# Patient Record
Sex: FEMALE | Race: WHITE | Employment: OTHER | URBAN - METROPOLITAN AREA
[De-identification: names, ages, dates, MRNs, and addresses within clinical notes are randomized per-mention and may not be internally consistent; named-entity substitution may affect disease eponyms.]

---

## 2017-01-03 ENCOUNTER — TELEPHONE (OUTPATIENT)
Dept: INTERNAL MEDICINE CLINIC | Facility: CLINIC | Age: 82
End: 2017-01-03

## 2017-01-05 ENCOUNTER — OFFICE VISIT (OUTPATIENT)
Dept: HEMATOLOGY/ONCOLOGY | Facility: HOSPITAL | Age: 82
End: 2017-01-05
Attending: INTERNAL MEDICINE
Payer: COMMERCIAL

## 2017-01-05 VITALS
DIASTOLIC BLOOD PRESSURE: 64 MMHG | HEIGHT: 63 IN | WEIGHT: 138 LBS | HEART RATE: 67 BPM | RESPIRATION RATE: 16 BRPM | TEMPERATURE: 97 F | SYSTOLIC BLOOD PRESSURE: 157 MMHG | BODY MASS INDEX: 24.45 KG/M2

## 2017-01-05 DIAGNOSIS — Z79.811 ENCOUNTER FOR MONITORING AROMATASE INHIBITOR THERAPY: ICD-10-CM

## 2017-01-05 DIAGNOSIS — Z85.43 PERSONAL HISTORY OF MALIGNANT NEOPLASM OF OVARY: ICD-10-CM

## 2017-01-05 DIAGNOSIS — M85.80 OSTEOPENIA DETERMINED BY X-RAY: ICD-10-CM

## 2017-01-05 DIAGNOSIS — Z51.81 ENCOUNTER FOR MONITORING AROMATASE INHIBITOR THERAPY: ICD-10-CM

## 2017-01-05 DIAGNOSIS — C50.811 CANCER OF OVERLAPPING SITES OF RIGHT FEMALE BREAST (HCC): Primary | ICD-10-CM

## 2017-01-05 PROCEDURE — 99213 OFFICE O/P EST LOW 20 MIN: CPT | Performed by: INTERNAL MEDICINE

## 2017-01-05 PROCEDURE — 99214 OFFICE O/P EST MOD 30 MIN: CPT | Performed by: INTERNAL MEDICINE

## 2017-01-05 RX ORDER — LOVASTATIN 40 MG/1
TABLET ORAL
Qty: 90 TABLET | Refills: 0 | Status: SHIPPED | OUTPATIENT
Start: 2017-01-05 | End: 2017-03-28

## 2017-01-05 NOTE — TELEPHONE ENCOUNTER
Cholesterol Medications: Medication refilled for 90 days per protocol.     Protocol Criteria:  · Appointment scheduled in the past 12 months or in the next 3 months  · ALT & LDL on file in the past 12 months  · ALT result < 80  · LDL result <130   Recent Vi

## 2017-01-15 RX ORDER — ANASTROZOLE 1 MG/1
1 TABLET ORAL DAILY
Qty: 90 TABLET | Refills: 3 | Status: SHIPPED | OUTPATIENT
Start: 2017-01-15 | End: 2018-01-10

## 2017-01-15 NOTE — PROGRESS NOTES
ANA MARIA   Estelita Vazquez is a 80year old female who presents for follow-up of her right breast cancer. She underwent mastectomy 4/27/16. Patient has a history of ovarian carcinoma in 2006.   She did have genetic testing and does not have a clinically signif 4/27/2016 Surgery Mammo screening 9 mm right mid breast lesion, MRI 3 mm suspicious area in the posterior upper outer quadrant R breast Bx + infilftrating ductal G1 with extensive DCIS, + hematoma after the Bx, resolution of the hematoma prior to surgery, betamethasone dipropionate 0.05 % External Cream Apply 1 Application topically 2 (two) times daily. Disp: 45 g Rfl: 0   anastrozole 1 MG Oral Tab tab Take 1 tablet (1 mg total) by mouth daily.  Disp: 90 tablet Rfl: 3   metoprolol Tartrate (LOPRESSOR) 25 MG Smoking status: Never Smoker     Smokeless tobacco: Not on file    Alcohol Use: Yes    Comment: rarely    Drug Use: No    Sexual Activity: Not on file   Not on file  Other Topics Concern    Caffeine Concern Yes    Comment: coffee, 1 cup     Social History Psychiatric: She has a normal mood and affect. Her behavior is normal. Judgment and thought content normal.   Nursing note and vitals reviewed.           ASSESSMENT/PLAN:   Cancer of overlapping sites of right female breast (hcc)  (primary encounter diagnos TECHNIQUE:     Breast MRI was performed using dynamic intravenous gadolinium infusion, thin sections, and a dedicated breast coil.  Contrast enhancement analysis was assisted by computer-aided detection.         ENHANCEMENT PATTERN:  There is scattered fib             GUI, 11/30/2009, 18:33.     INDICATIONS: Postmenopausal and history of breast cancer.     TECHNIQUE:   Measurement of bone mineral density of the lumbar spine and               hip was performed on a Hologic dual energy x-ray               abs Hip Fracture: 4.5 % Dictated by (CST): Marika Quinones MD on 6/13/2016 at  19:21     Approved by (CST): Marika Quinones MD on 6/13/2016 at 19:23         Meds This Visit:  See list above

## 2017-01-31 RX ORDER — LEVOTHYROXINE SODIUM 112 UG/1
TABLET ORAL
Qty: 90 TABLET | Refills: 0 | Status: SHIPPED | OUTPATIENT
Start: 2017-01-31 | End: 2017-04-23

## 2017-01-31 RX ORDER — METOPROLOL TARTRATE 50 MG/1
TABLET, FILM COATED ORAL
Qty: 90 TABLET | Refills: 0 | Status: SHIPPED | OUTPATIENT
Start: 2017-01-31 | End: 2017-04-24

## 2017-02-24 RX ORDER — AMLODIPINE BESYLATE 10 MG/1
TABLET ORAL
Qty: 90 TABLET | Refills: 0 | Status: SHIPPED | OUTPATIENT
Start: 2017-02-24 | End: 2017-05-19

## 2017-03-28 RX ORDER — LOVASTATIN 40 MG/1
TABLET ORAL
Qty: 90 TABLET | Refills: 0 | Status: SHIPPED | OUTPATIENT
Start: 2017-03-28 | End: 2017-06-17

## 2017-04-03 ENCOUNTER — TELEPHONE (OUTPATIENT)
Dept: INTERNAL MEDICINE CLINIC | Facility: CLINIC | Age: 82
End: 2017-04-03

## 2017-04-03 NOTE — TELEPHONE ENCOUNTER
See 1/5 mammogram order from Dr Emani Pham- pt requesting PA/ referral   Scheduled for 4/5 at [facility]

## 2017-04-05 ENCOUNTER — HOSPITAL ENCOUNTER (OUTPATIENT)
Dept: MAMMOGRAPHY | Facility: HOSPITAL | Age: 82
Discharge: HOME | End: 2017-04-05
Attending: SURGERY
Payer: COMMERCIAL

## 2017-04-05 DIAGNOSIS — Z85.3 HISTORY OF BREAST CANCER: ICD-10-CM

## 2017-04-05 PROCEDURE — 77065 DX MAMMO INCL CAD UNI: CPT | Performed by: RADIOLOGY

## 2017-04-23 RX ORDER — METOPROLOL TARTRATE 50 MG/1
TABLET, FILM COATED ORAL
Qty: 90 TABLET | Refills: 0 | Status: CANCELLED | OUTPATIENT
Start: 2017-04-23

## 2017-04-24 ENCOUNTER — OFFICE VISIT (OUTPATIENT)
Dept: INTERNAL MEDICINE CLINIC | Facility: CLINIC | Age: 82
End: 2017-04-24

## 2017-04-24 VITALS
RESPIRATION RATE: 16 BRPM | WEIGHT: 134 LBS | BODY MASS INDEX: 23.74 KG/M2 | HEIGHT: 63 IN | HEART RATE: 85 BPM | SYSTOLIC BLOOD PRESSURE: 148 MMHG | DIASTOLIC BLOOD PRESSURE: 76 MMHG

## 2017-04-24 DIAGNOSIS — C50.811 MALIGNANT NEOPLASM OF OVERLAPPING SITES OF RIGHT FEMALE BREAST (HCC): ICD-10-CM

## 2017-04-24 DIAGNOSIS — E78.2 MIXED HYPERLIPIDEMIA: ICD-10-CM

## 2017-04-24 DIAGNOSIS — I10 ESSENTIAL HYPERTENSION WITH GOAL BLOOD PRESSURE LESS THAN 140/90: Primary | ICD-10-CM

## 2017-04-24 PROCEDURE — 99212 OFFICE O/P EST SF 10 MIN: CPT | Performed by: INTERNAL MEDICINE

## 2017-04-24 PROCEDURE — 99214 OFFICE O/P EST MOD 30 MIN: CPT | Performed by: INTERNAL MEDICINE

## 2017-04-24 RX ORDER — LEVOTHYROXINE SODIUM 112 UG/1
TABLET ORAL
Qty: 90 TABLET | Refills: 0 | Status: SHIPPED | OUTPATIENT
Start: 2017-04-24 | End: 2017-07-12

## 2017-04-24 RX ORDER — METOPROLOL TARTRATE 50 MG/1
50 TABLET, FILM COATED ORAL 2 TIMES DAILY
Qty: 180 TABLET | Refills: 2 | Status: SHIPPED | OUTPATIENT
Start: 2017-04-24 | End: 2017-12-05

## 2017-04-24 NOTE — PROGRESS NOTES
Brittni Sousa is a 80year old female. HPI:   1. Essential hypertension with goal blood pressure less than 140/90    Patient has been following low salt diet and has been taking anti-hypertensive prescriptions as prescribed.  Blood pressure has been checked mass LRF   • Papilloma of right eyelid 14     Excision of papilloma of the RLL   • Pregnancy       x 3   • Rotator cuff injury      right   • Hypertension    • Dyslipidemia    • Peripheral neuropathy due to chemotherapy [facility])    • Breast cancer ( weight. 3. Malignant neoplasm of overlapping sites of right female breast [facility])    Had mastectiomy last year.  Needs 2 new bras and will write referral to [facility]\"    - DME - External    The patient indicates understanding of these issues and

## 2017-05-19 RX ORDER — AMLODIPINE BESYLATE 10 MG/1
TABLET ORAL
Qty: 90 TABLET | Refills: 0 | Status: SHIPPED | OUTPATIENT
Start: 2017-05-19 | End: 2017-08-08

## 2017-06-19 RX ORDER — LOVASTATIN 40 MG/1
TABLET ORAL
Qty: 90 TABLET | Refills: 0 | Status: SHIPPED | OUTPATIENT
Start: 2017-06-19 | End: 2017-09-05

## 2017-06-20 NOTE — TELEPHONE ENCOUNTER
Cholesterol Medications: Refill protocol failed because the patient did not meet the protocol criteria.  Please advise in regards to refill request     Protocol Criteria:  · Appointment scheduled in the past 12 months or in the next 3 months  · ALT & LDL on

## 2017-07-05 ENCOUNTER — TELEPHONE (OUTPATIENT)
Dept: INTERNAL MEDICINE CLINIC | Facility: CLINIC | Age: 82
End: 2017-07-05

## 2017-07-05 DIAGNOSIS — C50.811 MALIGNANT NEOPLASM OF OVERLAPPING SITES OF RIGHT FEMALE BREAST, UNSPECIFIED ESTROGEN RECEPTOR STATUS (HCC): Primary | ICD-10-CM

## 2017-07-05 NOTE — TELEPHONE ENCOUNTER
Pt requesting referral to see Dr. Trinidad Chavez.  Pt has follow up appt with Dr. Trinidad Chavez on 8/14    C50.811 (ICD-10-CM) - 174.8 (ICD-9-CM) - Cancer of overlapping sites of right [facility] ([facility]  Z51.81, Z79.811 (ICD-10-CM) - V58.83, V07.52 (ICD-9-CM) - Encounte

## 2017-07-12 RX ORDER — LEVOTHYROXINE SODIUM 112 UG/1
TABLET ORAL
Qty: 90 TABLET | Refills: 0 | Status: SHIPPED | OUTPATIENT
Start: 2017-07-12 | End: 2017-07-18

## 2017-07-18 RX ORDER — LEVOTHYROXINE SODIUM 112 UG/1
TABLET ORAL
Qty: 90 TABLET | Refills: 0 | Status: SHIPPED | OUTPATIENT
Start: 2017-07-18 | End: 2017-12-05

## 2017-07-26 ENCOUNTER — OFFICE VISIT (OUTPATIENT)
Dept: INTERNAL MEDICINE CLINIC | Facility: CLINIC | Age: 82
End: 2017-07-26

## 2017-07-26 VITALS
WEIGHT: 133 LBS | BODY MASS INDEX: 24 KG/M2 | HEART RATE: 55 BPM | RESPIRATION RATE: 16 BRPM | SYSTOLIC BLOOD PRESSURE: 136 MMHG | DIASTOLIC BLOOD PRESSURE: 76 MMHG

## 2017-07-26 DIAGNOSIS — C50.811 MALIGNANT NEOPLASM OF OVERLAPPING SITES OF RIGHT FEMALE BREAST, UNSPECIFIED ESTROGEN RECEPTOR STATUS (HCC): ICD-10-CM

## 2017-07-26 DIAGNOSIS — E78.2 MIXED HYPERLIPIDEMIA: ICD-10-CM

## 2017-07-26 DIAGNOSIS — I10 ESSENTIAL HYPERTENSION WITH GOAL BLOOD PRESSURE LESS THAN 140/90: Primary | ICD-10-CM

## 2017-07-26 PROCEDURE — 99214 OFFICE O/P EST MOD 30 MIN: CPT | Performed by: INTERNAL MEDICINE

## 2017-07-26 PROCEDURE — 99212 OFFICE O/P EST SF 10 MIN: CPT | Performed by: INTERNAL MEDICINE

## 2017-07-26 NOTE — PROGRESS NOTES
Kodak Dye is a 80year old female. HPI:   1. Essential hypertension with goal blood pressure less than 140/90    Patient has been following low salt diet and has been taking anti-hypertensive prescriptions as prescribed.  Blood pressure has been checked OU   • Osteopenia determined by x-ray 10/18/2007   • Osteoporosis    • Ovarian cancer ([facility] [address] 2006    stage 3C   • Papilloma of right eyelid 12/19/14    Excision of papilloma of the RLL   • Peripheral neuropathy due to chemotherapy [facility])    • Pregnancy     N female breast, unspecified estrogen receptor status    To see dr Freddy Paulson in follow up in next month. Has been doing quite well. Has new bra from [facility]". The patient indicates understanding of these issues and agrees to the plan.     The soy

## 2017-08-08 RX ORDER — AMLODIPINE BESYLATE 10 MG/1
TABLET ORAL
Qty: 90 TABLET | Refills: 0 | Status: SHIPPED | OUTPATIENT
Start: 2017-08-08 | End: 2017-11-22

## 2017-08-08 RX ORDER — AMLODIPINE BESYLATE 10 MG/1
TABLET ORAL
Qty: 90 TABLET | Refills: 0 | Status: SHIPPED | OUTPATIENT
Start: 2017-08-08 | End: 2019-01-22

## 2017-08-14 ENCOUNTER — OFFICE VISIT (OUTPATIENT)
Dept: HEMATOLOGY/ONCOLOGY | Facility: HOSPITAL | Age: 82
End: 2017-08-14
Attending: INTERNAL MEDICINE
Payer: COMMERCIAL

## 2017-08-14 VITALS
DIASTOLIC BLOOD PRESSURE: 64 MMHG | RESPIRATION RATE: 16 BRPM | SYSTOLIC BLOOD PRESSURE: 142 MMHG | HEIGHT: 63 IN | WEIGHT: 134 LBS | HEART RATE: 60 BPM | BODY MASS INDEX: 23.74 KG/M2 | TEMPERATURE: 98 F

## 2017-08-14 DIAGNOSIS — Z17.0 MALIGNANT NEOPLASM OF OVERLAPPING SITES OF RIGHT BREAST IN FEMALE, ESTROGEN RECEPTOR POSITIVE (HCC): Primary | ICD-10-CM

## 2017-08-14 DIAGNOSIS — G62.0 PERIPHERAL NEUROPATHY DUE TO CHEMOTHERAPY (HCC): ICD-10-CM

## 2017-08-14 DIAGNOSIS — C50.811 MALIGNANT NEOPLASM OF OVERLAPPING SITES OF RIGHT BREAST IN FEMALE, ESTROGEN RECEPTOR POSITIVE (HCC): Primary | ICD-10-CM

## 2017-08-14 DIAGNOSIS — T45.1X5A PERIPHERAL NEUROPATHY DUE TO CHEMOTHERAPY (HCC): ICD-10-CM

## 2017-08-14 DIAGNOSIS — C56.9 OVARIAN CANCER, UNSPECIFIED LATERALITY (HCC): ICD-10-CM

## 2017-08-14 DIAGNOSIS — Z79.811 ENCOUNTER FOR MONITORING AROMATASE INHIBITOR THERAPY: ICD-10-CM

## 2017-08-14 DIAGNOSIS — Z51.81 ENCOUNTER FOR MONITORING AROMATASE INHIBITOR THERAPY: ICD-10-CM

## 2017-08-14 DIAGNOSIS — M85.80 OSTEOPENIA DETERMINED BY X-RAY: ICD-10-CM

## 2017-08-14 PROCEDURE — 99212 OFFICE O/P EST SF 10 MIN: CPT | Performed by: INTERNAL MEDICINE

## 2017-08-14 PROCEDURE — 99214 OFFICE O/P EST MOD 30 MIN: CPT | Performed by: INTERNAL MEDICINE

## 2017-08-14 NOTE — PROGRESS NOTES
ANA MARIA Singh is a 80year old female who presents for follow-up of her right breast cancer. She underwent mastectomy 4/27/16. Patient has a history of ovarian carcinoma in 2006.   She did have genetic testing and does not have a clinically signific Mammo screening 9 mm right mid breast lesion, MRI 3 mm suspicious area in the posterior upper outer quadrant R breast Bx + infilftrating ductal G1 with extensive DCIS, + hematoma after the Bx, resolution of the hematoma prior to surgery, genetic testing betamethasone dipropionate 0.05 % External Cream Apply 1 Application topically 2 (two) times daily. Disp: 45 g Rfl: 0   anastrozole 1 MG Oral Tab tab Take 1 tablet (1 mg total) by mouth daily.  Disp: 90 tablet Rfl: 3   Multiple Vitamin (ONE-DAILY MULTI CARLTON Smokeless tobacco: Never Used    Alcohol use Yes    Comment: rarely    Drug use: No    Sexual activity: Not on file     Other Topics Concern    Caffeine Concern Yes    Comment: coffee, 1 cup     Social History Narrative   None on file     Family History Neurological: She is alert and oriented to person, place, and time. No cranial nerve deficit. Skin: Skin is warm and dry. Psychiatric: She has a normal mood and affect.  Her behavior is normal. Judgment and thought content normal.   Nursing note and vit 9/04/2013, 11:28. [facility], [facility]. [facility], [facility] DIGITAL SCREEN W CAD PF, 2/12/2016, 13:45. [facility], [facility]. [facility], [facility] DIGITAL ADDITIONAL VIEW RT, 2/24/2016, 14:08.   [facility], [facility], [facility] DIGITAL UNI RT,   3/09/ INDICATIONS:            Ms. Milderd Wylie, who is 80 years with history of right breast malignancy status post mastectomy 4/27/16, presents for followup breast MRI to reassess a probably benign area of 2 foci in the left breast 5:00 position seen on the patient's M PLEASE NOTE:  A NORMAL MRI DOES NOT EXCLUDE THE POSSIBILITY OF BREAST CANCER.   A CLINICALLY SUSPICIOUS PALPABLE LUMP SHOULD BE BIOPSIED.              PROCEDURE:   MRI BREAST (W+WO) W/CAD BILAT (CPT=77059/0159T)      [facility] 1. The previously seen probably benign foci in the left breast 5:00 position are much less conspicuous than that seen in the prior MRI. There are no suspicious areas of enhancement in the left breast.      2. Status post right breast mastectomy.  No suspici following should be considered for treatment:       A hip or vertebral (clinical or morphometric) fracture       T score < -2.5 at the femoral neck or spine after appropriate  evaluation to exclude secondary causes.       Low bone mass (T score between -1.

## 2017-09-05 RX ORDER — LOVASTATIN 40 MG/1
TABLET ORAL
Qty: 90 TABLET | Refills: 0 | Status: SHIPPED | OUTPATIENT
Start: 2017-09-05 | End: 2017-12-05

## 2017-09-08 RX ORDER — LOVASTATIN 40 MG/1
TABLET ORAL
Qty: 90 TABLET | Refills: 0 | OUTPATIENT
Start: 2017-09-08

## 2017-09-22 PROBLEM — G62.0 PERIPHERAL NEUROPATHY DUE TO CHEMOTHERAPY: Status: ACTIVE | Noted: 2017-09-22

## 2017-09-22 PROBLEM — G62.0 PERIPHERAL NEUROPATHY DUE TO CHEMOTHERAPY (HCC): Status: ACTIVE | Noted: 2017-09-22

## 2017-09-22 PROBLEM — T45.1X5A PERIPHERAL NEUROPATHY DUE TO CHEMOTHERAPY: Status: ACTIVE | Noted: 2017-09-22

## 2017-09-22 PROBLEM — T45.1X5A PERIPHERAL NEUROPATHY DUE TO CHEMOTHERAPY (HCC): Status: ACTIVE | Noted: 2017-09-22

## 2017-10-13 ENCOUNTER — TELEPHONE (OUTPATIENT)
Dept: INTERNAL MEDICINE CLINIC | Facility: CLINIC | Age: 82
End: 2017-10-13

## 2017-10-13 ENCOUNTER — TELEPHONE (OUTPATIENT)
Dept: OPHTHALMOLOGY | Facility: CLINIC | Age: 82
End: 2017-10-13

## 2017-10-13 DIAGNOSIS — H53.9 VISION CHANGES: Primary | ICD-10-CM

## 2017-10-13 NOTE — TELEPHONE ENCOUNTER
Patient has an appointment scheduled with Dr. Chano Valenzuela on 10/17/17  and they need a referral for the appointment. Please do not hesitate to call me if you have any questions about this. Thank you, Mark Jama at Dr. Ronan Harper office [phone].

## 2017-10-17 ENCOUNTER — OFFICE VISIT (OUTPATIENT)
Dept: OPHTHALMOLOGY | Facility: CLINIC | Age: 82
End: 2017-10-17

## 2017-10-17 DIAGNOSIS — Z96.1 PSEUDOPHAKIA OF BOTH EYES: ICD-10-CM

## 2017-10-17 DIAGNOSIS — H02.9 EYELID LESION: ICD-10-CM

## 2017-10-17 DIAGNOSIS — H35.371 EPIRETINAL MEMBRANE, RIGHT EYE: Primary | ICD-10-CM

## 2017-10-17 DIAGNOSIS — H43.393 VITREOUS FLOATERS OF BOTH EYES: ICD-10-CM

## 2017-10-17 PROCEDURE — 99212 OFFICE O/P EST SF 10 MIN: CPT | Performed by: OPHTHALMOLOGY

## 2017-10-17 PROCEDURE — 99243 OFF/OP CNSLTJ NEW/EST LOW 30: CPT | Performed by: OPHTHALMOLOGY

## 2017-10-17 NOTE — PROGRESS NOTES
Shankar Valverde is a 80year old female. HPI:     HPI     Consult    Additional comments: Referral from Dr. Fortunato Tabares denies any blurred vision at distance or near and is happy with her glasses. Pt has no ocular complaints. • Breast Cancer Self 85   • Ovarian Cancer Self 75   • Lipids Neg      no family h/o hyperlipidemia and vascular disease   • Glaucoma Neg    • Macular degeneration Neg        Social History: Smoking status: Never Smoker Right PERRL    Left PERRL          Visual Fields       Left Right     Full Full          Extraocular Movement       Right Left     Full, Ortho Full, Ortho          Dilation     Both eyes:  1.0% Mydriacyl and 2.5% Danie Synephrine @ 3:48 PM            Slit Complete eye exam.    10/17/2017  Scribed by: Viral Houser MD

## 2017-10-17 NOTE — PATIENT INSTRUCTIONS
Epiretinal membrane, right eye  Stable; no treatment. Pseudophakia of both eyes  No treatment. Vitreous floaters of both eyes  No treatment.      Eyelid lesion  Suspicious, umbilicated lid lesion on the right upper lid nasally discussed with patient

## 2017-11-13 ENCOUNTER — TELEPHONE (OUTPATIENT)
Dept: INTERNAL MEDICINE CLINIC | Facility: CLINIC | Age: 82
End: 2017-11-13

## 2017-11-13 DIAGNOSIS — D48.5 NEOPLASM OF UNCERTAIN BEHAVIOR OF SKIN OF EYELID: ICD-10-CM

## 2017-11-13 DIAGNOSIS — E78.2 MIXED HYPERLIPIDEMIA: Primary | ICD-10-CM

## 2017-11-13 NOTE — TELEPHONE ENCOUNTER
Pt is scheduled for an px on 11/22. Pt is requesting to complete labs prior. Please advise on orders.

## 2017-11-13 NOTE — TELEPHONE ENCOUNTER
Pt contacted and informed pt lab order has been generated. Informed pt to fast for 12 hours before getting lab work done.   Pt verbalized understanding

## 2017-11-14 ENCOUNTER — APPOINTMENT (OUTPATIENT)
Dept: LAB | Facility: HOSPITAL | Age: 82
End: 2017-11-14
Attending: OPHTHALMOLOGY
Payer: COMMERCIAL

## 2017-11-14 PROCEDURE — 88305 TISSUE EXAM BY PATHOLOGIST: CPT | Performed by: OPHTHALMOLOGY

## 2017-11-17 ENCOUNTER — TELEPHONE (OUTPATIENT)
Dept: OTHER | Age: 82
End: 2017-11-17

## 2017-11-17 DIAGNOSIS — L98.9 SKIN ABNORMALITIES: Primary | ICD-10-CM

## 2017-11-17 NOTE — TELEPHONE ENCOUNTER
Pt called [facility] dept requesting a referral for Dermatologist. Per pt being referred by Dr. Steiner Her unsure of derm doctors name.  Spoke with Dr. Espinoza Perez office unable to provided name of Dermatologist,per office  Dr. Steiner Her has not documented in pt's chart as of y

## 2017-11-17 NOTE — TELEPHONE ENCOUNTER
Dr Maggie Cohen from [address] [place] [address] Opthalmology calling Dr Marija Arreguin to report that he removed a lesion from pt eyelid and pathology results indicate Basal Cell Carcinoma.   Pt has already been notified from Dr Maggie Cohen and he is faxing over the pathology report to Dr Wong Florentino

## 2017-11-17 NOTE — TELEPHONE ENCOUNTER
Not sure what is going on here. See opthy referral and no derm   I would have her see Kemi or Marv Butts for skin evaluation.

## 2017-11-21 ENCOUNTER — TELEPHONE (OUTPATIENT)
Dept: OPHTHALMOLOGY | Facility: CLINIC | Age: 82
End: 2017-11-21

## 2017-11-22 ENCOUNTER — MED REC SCAN ONLY (OUTPATIENT)
Dept: INTERNAL MEDICINE CLINIC | Facility: CLINIC | Age: 82
End: 2017-11-22

## 2017-11-22 ENCOUNTER — OFFICE VISIT (OUTPATIENT)
Dept: INTERNAL MEDICINE CLINIC | Facility: CLINIC | Age: 82
End: 2017-11-22

## 2017-11-22 ENCOUNTER — LAB ENCOUNTER (OUTPATIENT)
Dept: LAB | Facility: HOSPITAL | Age: 82
End: 2017-11-22
Attending: INTERNAL MEDICINE
Payer: COMMERCIAL

## 2017-11-22 ENCOUNTER — PATIENT MESSAGE (OUTPATIENT)
Dept: INTERNAL MEDICINE CLINIC | Facility: CLINIC | Age: 82
End: 2017-11-22

## 2017-11-22 VITALS
BODY MASS INDEX: 23.21 KG/M2 | RESPIRATION RATE: 16 BRPM | DIASTOLIC BLOOD PRESSURE: 77 MMHG | SYSTOLIC BLOOD PRESSURE: 192 MMHG | WEIGHT: 131 LBS | HEIGHT: 63 IN | HEART RATE: 88 BPM

## 2017-11-22 DIAGNOSIS — C44.111 BASAL CELL CARCINOMA (BCC) OF RIGHT EYELID: Primary | ICD-10-CM

## 2017-11-22 DIAGNOSIS — C44.111 BASAL CELL CARCINOMA (BCC) OF SKIN OF RIGHT EYELID INCLUDING CANTHUS: ICD-10-CM

## 2017-11-22 DIAGNOSIS — E78.2 MIXED HYPERLIPIDEMIA: ICD-10-CM

## 2017-11-22 DIAGNOSIS — Z00.00 PHYSICAL EXAM, ANNUAL: Primary | ICD-10-CM

## 2017-11-22 PROCEDURE — 84443 ASSAY THYROID STIM HORMONE: CPT

## 2017-11-22 PROCEDURE — 36415 COLL VENOUS BLD VENIPUNCTURE: CPT

## 2017-11-22 PROCEDURE — 80053 COMPREHEN METABOLIC PANEL: CPT

## 2017-11-22 PROCEDURE — 85025 COMPLETE CBC W/AUTO DIFF WBC: CPT

## 2017-11-22 PROCEDURE — 81001 URINALYSIS AUTO W/SCOPE: CPT

## 2017-11-22 PROCEDURE — 99397 PER PM REEVAL EST PAT 65+ YR: CPT | Performed by: INTERNAL MEDICINE

## 2017-11-22 PROCEDURE — 80061 LIPID PANEL: CPT

## 2017-11-22 NOTE — PROGRESS NOTES
HPI:   Rosaline Negron is a 80year old female who presents for a complete physical exam. Symptoms: is menopausal. Patient complains of nothing      Immunization History  Administered            Date(s) Administered    HIGH DOSE FLU 65 YRS AND OLDER PRSV FREE tablet (50 mg total) by mouth 2 (two) times daily. Disp: 180 tablet Rfl: 2   betamethasone dipropionate 0.05 % External Cream Apply 1 Application topically 2 (two) times daily.  Disp: 45 g Rfl: 0   anastrozole 1 MG Oral Tab tab Take 1 tablet (1 mg total) by • Heart Disease Brother      CAD - x3 brothers   • Heart Attack Brother      per NG: 3 brothers with MI's   • AAA [OTHER] Brother      AAA   • Diabetes Brother    • Cancer Brother 79     kidney/bladder ca; smoker   • Breast Cancer Daughter 64     DCIS; B mass  LUNGS: clear to auscultation  CARDIO: RRR without murmur  GI: good BS's,no masses, HSM or tenderness  :deferred  RECTAL:deferred  MUSCULOSKELETAL: back is not tender,FROM of the back  EXTREMITIES: no cyanosis, clubbing or edema  NEURO: Oriented luis carlos

## 2017-11-29 NOTE — TELEPHONE ENCOUNTER
Please see patient's email and advise. Referral created, pended.       ----- Message -----     From: Dominique Worley     Sent: 11/28/2017  4:47 PM CST       To: Martin Gasca MD  Subject: RE: Referral Request    Yes, I need a referral to Dr. Sylvia Lockett,

## 2017-12-01 ENCOUNTER — TELEPHONE (OUTPATIENT)
Dept: OTHER | Age: 82
End: 2017-12-01

## 2017-12-01 DIAGNOSIS — E03.9 HYPOTHYROIDISM, UNSPECIFIED TYPE: Primary | ICD-10-CM

## 2017-12-01 NOTE — TELEPHONE ENCOUNTER
----- Message from Jose Macedo MD sent at 12/1/2017 10:53 AM CST -----  Blood test is good with normal blood counts, sugar, liver,  urine, lipids and kidney tests. Thyroid mildly over active.  Skip taking the synthroid 1 day a week and see me in 3 m

## 2017-12-05 RX ORDER — LEVOTHYROXINE SODIUM 112 UG/1
112 TABLET ORAL
Qty: 90 TABLET | Refills: 0 | Status: SHIPPED | OUTPATIENT
Start: 2017-12-05 | End: 2019-04-23

## 2017-12-05 RX ORDER — METOPROLOL TARTRATE 50 MG/1
TABLET, FILM COATED ORAL
Qty: 180 TABLET | Refills: 0 | Status: SHIPPED | OUTPATIENT
Start: 2017-12-05 | End: 2018-11-01

## 2017-12-05 RX ORDER — LOVASTATIN 40 MG/1
TABLET ORAL
Qty: 90 TABLET | Refills: 0 | Status: SHIPPED | OUTPATIENT
Start: 2017-12-05 | End: 2018-03-29

## 2017-12-05 NOTE — TELEPHONE ENCOUNTER
Patient informed of results, and given MD's recommendations. Patient verbalized understanding with intent to comply. Questions answered. Patient will skip taking her levothyroxine on Sundays. Repeat TSH in 3 months prior to appt. TSH order entered.   Pa

## 2017-12-05 NOTE — TELEPHONE ENCOUNTER
Signed Prescriptions Disp Refills    LOVASTATIN 40 MG Oral Tab 90 tablet 0      Sig: TAKE 1 TABLET BY MOUTH NIGHTLY        Authorizing Provider: KANU Solo        Ordering User: Lisy Magaña      Levothyroxine Sodium 112 MCG Oral Tab 90 tablet for [facility] 1 year (around 10/17/2018) for Complete eye exam.          Lab Results  Component Value Date    (H) 11/22/2017   BUN 16 11/22/2017   CREATSERUM 0.73 11/22/2017   BUNCREA 21.9 (H) 11/22/2017   GFRNAA >60 11/22/2017   GFRAA >6 Kahlil Patel MD    Office Visit        Future Appointments       Provider Department Appt Notes    In 3 months Marija Arreguin, Arron Holland MD [facility], [facility], [facility] F/U    In 4 months Christopher Ahuja MD E LDL 93 11/22/2017       Lab Results  Component Value Date   ALT 19 11/22/2017

## 2018-01-10 DIAGNOSIS — Z51.81 ENCOUNTER FOR MONITORING AROMATASE INHIBITOR THERAPY: ICD-10-CM

## 2018-01-10 DIAGNOSIS — C50.811 CANCER OF OVERLAPPING SITES OF RIGHT FEMALE BREAST (HCC): ICD-10-CM

## 2018-01-10 DIAGNOSIS — Z79.811 ENCOUNTER FOR MONITORING AROMATASE INHIBITOR THERAPY: ICD-10-CM

## 2018-01-10 DIAGNOSIS — Z85.43 PERSONAL HISTORY OF MALIGNANT NEOPLASM OF OVARY: ICD-10-CM

## 2018-01-10 RX ORDER — ANASTROZOLE 1 MG/1
TABLET ORAL
Qty: 90 TABLET | Refills: 3 | Status: SHIPPED | OUTPATIENT
Start: 2018-01-10 | End: 2018-04-12

## 2018-01-15 ENCOUNTER — OFFICE VISIT (OUTPATIENT)
Dept: DERMATOLOGY CLINIC | Facility: CLINIC | Age: 83
End: 2018-01-15

## 2018-01-15 DIAGNOSIS — L71.9 ROSACEA: ICD-10-CM

## 2018-01-15 DIAGNOSIS — Z85.828 PERSONAL HISTORY OF SKIN CANCER: ICD-10-CM

## 2018-01-15 DIAGNOSIS — L81.4 SOLAR LENTIGO: ICD-10-CM

## 2018-01-15 DIAGNOSIS — L57.0 ACTINIC KERATOSIS: Primary | ICD-10-CM

## 2018-01-15 PROCEDURE — 99212 OFFICE O/P EST SF 10 MIN: CPT | Performed by: DERMATOLOGY

## 2018-01-15 PROCEDURE — 99201 OFFICE/OUTPT VISIT,NEW,LEVL I: CPT | Performed by: DERMATOLOGY

## 2018-01-15 NOTE — PROGRESS NOTES
Past Medical History:   Diagnosis Date   • Basal cell carcinoma 11/2017    right eyelid   • Breast cancer ([facility] 3/9/2016   • Carcinoma of right eyelid 11/17/2017    Excised by Dr. Tristen Conway - Basal cell carcinoma- nodular type, carcinoma is present at less joanne Social History Narrative   None on file     Family History   Problem Relation Age of Onset   • AAA [OTHER] Father      AAA   • Hypertension Sister    • Diabetes Sister    • Heart Disease Brother      CAD - x3 brothers   • Heart Attack Brother      per

## 2018-01-15 NOTE — PROGRESS NOTES
HPI:     Chief Complaint     Skin Cancer        HPI     Skin Cancer    Additional comments: Pt presents because diagnosed  with BCC to the right upper eyelid- bx performed 11/2017. Pt concerned with lesions to forehead, midbrow area.         Last edited by Disp:  Rfl:    Cholecalciferol (VITAMIN D) 1000 UNITS Oral Cap Take 1,000 Units by mouth daily. Disp:  Rfl:    betamethasone dipropionate 0.05 % External Cream Apply 1 Application topically 2 (two) times daily.  Disp: 45 g Rfl: 0     Allergies:   No Known A Never Used    Alcohol use Yes    Comment: rarely    Drug use: No    Sexual activity: Not on file     Other Topics Concern    Caffeine Concern Yes    Comment: coffee, 1 cup    Pt has a pacemaker No    Pt has a defibrillator No    Reaction to local anestheti understands to put it on  15-20 minutes before outsides so that  it is absorbed and working and to reapply it every few hours. Rosacea-lesions on nose appear to be secondary to rosacea-no treatment at this time.     No orders of the defined types were plac

## 2018-02-14 ENCOUNTER — OFFICE VISIT (OUTPATIENT)
Dept: INTERNAL MEDICINE CLINIC | Facility: CLINIC | Age: 83
End: 2018-02-14

## 2018-02-14 VITALS
RESPIRATION RATE: 16 BRPM | HEART RATE: 65 BPM | SYSTOLIC BLOOD PRESSURE: 122 MMHG | HEIGHT: 63 IN | WEIGHT: 136 LBS | DIASTOLIC BLOOD PRESSURE: 64 MMHG | BODY MASS INDEX: 24.1 KG/M2

## 2018-02-14 DIAGNOSIS — I10 ESSENTIAL HYPERTENSION WITH GOAL BLOOD PRESSURE LESS THAN 140/90: ICD-10-CM

## 2018-02-14 DIAGNOSIS — E78.2 MIXED HYPERLIPIDEMIA: ICD-10-CM

## 2018-02-14 DIAGNOSIS — M79.605 ACUTE LEG PAIN, LEFT: Primary | ICD-10-CM

## 2018-02-14 PROCEDURE — 99214 OFFICE O/P EST MOD 30 MIN: CPT | Performed by: INTERNAL MEDICINE

## 2018-02-14 PROCEDURE — 99212 OFFICE O/P EST SF 10 MIN: CPT | Performed by: INTERNAL MEDICINE

## 2018-02-14 RX ORDER — TRAMADOL HYDROCHLORIDE 50 MG/1
50 TABLET ORAL EVERY 6 HOURS PRN
Qty: 30 TABLET | Refills: 1 | Status: SHIPPED | OUTPATIENT
Start: 2018-02-14 | End: 2018-03-21

## 2018-02-14 NOTE — PROGRESS NOTES
Tutu Devi is a 80year old female. HPI:   1. Leg injury    Pulling a dresser last week and had a dresser fall on her leg. Had pain in left thigh area. Able to walk but uses a cane to ambulate currently. Pain is currently 6-7/10 with ambulation.  Tiff wh Breast cancer ([facility] 3/9/2016   • Carcinoma of right eyelid 11/17/2017    Excised by Dr. Elida Pelletier - Basal cell carcinoma- nodular type, carcinoma is present at less than 0.5 mm from the deep tissure edge   • Dyslipidemia    • Epiretinal membrane (ERM) of right ultram if pain is severe. Checked the  site and found that patient has NOT been prescribed controlled substances in the past and will fill this time.      2. Essential hypertension  with goal blood pressure less than 140/90    Patient instructed to take

## 2018-02-26 ENCOUNTER — OFFICE VISIT (OUTPATIENT)
Dept: DERMATOLOGY CLINIC | Facility: CLINIC | Age: 83
End: 2018-02-26

## 2018-02-26 DIAGNOSIS — D23.70 BENIGN NEOPLASM OF SKIN OF LOWER EXTREMITY, INCLUDING HIP, UNSPECIFIED LATERALITY: ICD-10-CM

## 2018-02-26 DIAGNOSIS — D23.60 BENIGN NEOPLASM OF SKIN OF UPPER EXTREMITY AND SHOULDER, UNSPECIFIED LATERALITY: ICD-10-CM

## 2018-02-26 DIAGNOSIS — Z87.2 HISTORY OF ACTINIC KERATOSES: ICD-10-CM

## 2018-02-26 DIAGNOSIS — L57.8 SUN-DAMAGED SKIN: ICD-10-CM

## 2018-02-26 DIAGNOSIS — D23.30 BENIGN NEOPLASM OF SKIN OF FACE: ICD-10-CM

## 2018-02-26 DIAGNOSIS — L81.4 SOLAR LENTIGO: ICD-10-CM

## 2018-02-26 DIAGNOSIS — D23.4 BENIGN NEOPLASM OF SCALP AND SKIN OF NECK: ICD-10-CM

## 2018-02-26 DIAGNOSIS — D23.5 BENIGN NEOPLASM OF SKIN OF TRUNK, EXCEPT SCROTUM: ICD-10-CM

## 2018-02-26 DIAGNOSIS — Z85.828 PERSONAL HISTORY OF SKIN CANCER: Primary | ICD-10-CM

## 2018-02-26 DIAGNOSIS — L82.1 SEBORRHEIC KERATOSES: ICD-10-CM

## 2018-02-26 PROCEDURE — 99212 OFFICE O/P EST SF 10 MIN: CPT | Performed by: DERMATOLOGY

## 2018-02-26 PROCEDURE — 99213 OFFICE O/P EST LOW 20 MIN: CPT | Performed by: DERMATOLOGY

## 2018-02-26 NOTE — PROGRESS NOTES
HPI:     Chief Complaint     Full Skin Exam        HPI     Full Skin Exam    Additional comments: LOV 01/15/18 pt was seen for lesions on her face that were treated with cryo, pt here for full body check pt has no new spots of concern at this time, pt has Basal cell carcinoma 11/2017    right eyelid   • Breast cancer ([facility] 3/9/2016   • Carcinoma of right eyelid 11/17/2017    Excised by Dr. Nathaniel Ramires - Basal cell carcinoma- nodular type, carcinoma is present at less than 0.5 mm from the deep tissure edge   • Dys Family History   Problem Relation Age of Onset   • AAA [OTHER] Father      AAA   • Hypertension Sister    • Diabetes Sister    • Heart Disease Brother      CAD - x3 brothers   • Heart Attack Brother      per NG: 3 brothers with MI's   • AAA [OTHER] Bro every few hours. Solar lentigo  History of actinic keratoses  Benign neoplasm of skin of trunk, except scrotum-ABCDE's of melanoma discussed. the patient is to follow up yearly. The patient will come sooner should they note  anything new or changing.

## 2018-03-21 ENCOUNTER — APPOINTMENT (OUTPATIENT)
Dept: LAB | Age: 83
End: 2018-03-21
Attending: INTERNAL MEDICINE
Payer: COMMERCIAL

## 2018-03-21 ENCOUNTER — OFFICE VISIT (OUTPATIENT)
Dept: INTERNAL MEDICINE CLINIC | Facility: CLINIC | Age: 83
End: 2018-03-21

## 2018-03-21 VITALS
HEIGHT: 63 IN | WEIGHT: 128 LBS | SYSTOLIC BLOOD PRESSURE: 130 MMHG | BODY MASS INDEX: 22.68 KG/M2 | RESPIRATION RATE: 16 BRPM | HEART RATE: 64 BPM | DIASTOLIC BLOOD PRESSURE: 77 MMHG

## 2018-03-21 DIAGNOSIS — E78.2 MIXED HYPERLIPIDEMIA: ICD-10-CM

## 2018-03-21 DIAGNOSIS — I10 ESSENTIAL HYPERTENSION WITH GOAL BLOOD PRESSURE LESS THAN 140/90: Primary | ICD-10-CM

## 2018-03-21 DIAGNOSIS — E03.9 HYPOTHYROIDISM, UNSPECIFIED TYPE: ICD-10-CM

## 2018-03-21 LAB — TSH SERPL-ACNC: 0.87 UIU/ML (ref 0.45–5.33)

## 2018-03-21 PROCEDURE — 99214 OFFICE O/P EST MOD 30 MIN: CPT | Performed by: INTERNAL MEDICINE

## 2018-03-21 PROCEDURE — 84443 ASSAY THYROID STIM HORMONE: CPT

## 2018-03-21 PROCEDURE — 36415 COLL VENOUS BLD VENIPUNCTURE: CPT

## 2018-03-21 PROCEDURE — 99212 OFFICE O/P EST SF 10 MIN: CPT | Performed by: INTERNAL MEDICINE

## 2018-03-21 NOTE — PROGRESS NOTES
Jer Garcia is a 80year old female. HPI:     1. Essential hypertension with goal blood pressure less than 140/90    Patient has been following low salt diet and has been taking anti-hypertensive prescriptions as prescribed.  Blood pressure has been check carcinoma is present at less than 0.5 mm from the deep tissure edge   • Dyslipidemia    • Epiretinal membrane (ERM) of right eye    • Hypertension    • Hypothyroidism 1990   • Mass 2013    excision mass LRF   • MGD (meibomian gland dysfunction) 2011    OU Hyperlipidemia     Patient instructed to take cholesterol lowering medications as prescribed and to continue to follow a low fat, low cholesterol diet as discussed.  Try to exercise at least 3 times weekly to build strength, burn calories and help to [employer]

## 2018-04-02 ENCOUNTER — PATIENT MESSAGE (OUTPATIENT)
Dept: INTERNAL MEDICINE CLINIC | Facility: CLINIC | Age: 83
End: 2018-04-02

## 2018-04-02 DIAGNOSIS — C50.811 MALIGNANT NEOPLASM OF OVERLAPPING SITES OF RIGHT FEMALE BREAST, UNSPECIFIED ESTROGEN RECEPTOR STATUS (HCC): Primary | ICD-10-CM

## 2018-04-02 RX ORDER — LOVASTATIN 40 MG/1
TABLET ORAL
Qty: 90 TABLET | Refills: 0 | Status: SHIPPED | OUTPATIENT
Start: 2018-04-02 | End: 2018-06-27

## 2018-04-11 ENCOUNTER — TELEPHONE (OUTPATIENT)
Dept: HEMATOLOGY/ONCOLOGY | Facility: HOSPITAL | Age: 83
End: 2018-04-11

## 2018-04-12 ENCOUNTER — OFFICE VISIT (OUTPATIENT)
Dept: HEMATOLOGY/ONCOLOGY | Facility: HOSPITAL | Age: 83
End: 2018-04-12
Attending: INTERNAL MEDICINE
Payer: COMMERCIAL

## 2018-04-12 VITALS
TEMPERATURE: 99 F | WEIGHT: 133 LBS | SYSTOLIC BLOOD PRESSURE: 151 MMHG | DIASTOLIC BLOOD PRESSURE: 62 MMHG | HEIGHT: 63 IN | BODY MASS INDEX: 23.57 KG/M2 | RESPIRATION RATE: 16 BRPM | HEART RATE: 61 BPM

## 2018-04-12 DIAGNOSIS — C44.111 BASAL CELL CARCINOMA (BCC) OF SKIN OF RIGHT EYELID INCLUDING CANTHUS: ICD-10-CM

## 2018-04-12 DIAGNOSIS — T45.1X5A PERIPHERAL NEUROPATHY DUE TO CHEMOTHERAPY (HCC): ICD-10-CM

## 2018-04-12 DIAGNOSIS — Z51.81 ENCOUNTER FOR MONITORING AROMATASE INHIBITOR THERAPY: ICD-10-CM

## 2018-04-12 DIAGNOSIS — M85.80 OSTEOPENIA DETERMINED BY X-RAY: ICD-10-CM

## 2018-04-12 DIAGNOSIS — C50.811 MALIGNANT NEOPLASM OF OVERLAPPING SITES OF RIGHT BREAST IN FEMALE, ESTROGEN RECEPTOR POSITIVE (HCC): Primary | ICD-10-CM

## 2018-04-12 DIAGNOSIS — Z17.0 MALIGNANT NEOPLASM OF OVERLAPPING SITES OF RIGHT BREAST IN FEMALE, ESTROGEN RECEPTOR POSITIVE (HCC): Primary | ICD-10-CM

## 2018-04-12 DIAGNOSIS — G62.0 PERIPHERAL NEUROPATHY DUE TO CHEMOTHERAPY (HCC): ICD-10-CM

## 2018-04-12 DIAGNOSIS — Z79.811 ENCOUNTER FOR MONITORING AROMATASE INHIBITOR THERAPY: ICD-10-CM

## 2018-04-12 DIAGNOSIS — C56.3 MALIGNANT NEOPLASM OF BOTH OVARIES (HCC): ICD-10-CM

## 2018-04-12 PROCEDURE — 99214 OFFICE O/P EST MOD 30 MIN: CPT | Performed by: INTERNAL MEDICINE

## 2018-04-12 RX ORDER — ANASTROZOLE 1 MG/1
1 TABLET ORAL
Qty: 90 TABLET | Refills: 3 | Status: SHIPPED | OUTPATIENT
Start: 2018-04-12 | End: 2019-06-10

## 2018-05-09 RX ORDER — AMLODIPINE BESYLATE 10 MG/1
TABLET ORAL
Qty: 90 TABLET | Refills: 0 | Status: SHIPPED | OUTPATIENT
Start: 2018-05-09 | End: 2018-06-27

## 2018-05-28 NOTE — PROGRESS NOTES
ANA MARIA Ferguson is a 80year old female who presents for follow-up of her right hormone receptor positive, HER-2/yusuf negative, pT1b N1(mi) M0 breast cancer. She underwent mastectomy 4/27/16.    Patient has a history of ovarian carcinoma Stage IIIC in Mammo screening 9 mm right mid breast lesion, MRI 3 mm suspicious area in the posterior upper outer quadrant R breast Bx + infilftrating ductal G1 with extensive DCIS, + hematoma after the Bx, resolution of the hematoma prior to surgery, genetic testing METOPROLOL TARTRATE 50 MG Oral Tab TAKE 1 TABLET BY MOUTH TWICE DAILY Disp: 180 tablet Rfl: 0   AMLODIPINE BESYLATE 10 MG Oral Tab TAKE 1 TABLET BY MOUTH DAILY Disp: 90 tablet Rfl: 0   Multiple Vitamin (ONE-DAILY MULTI VITAMINS) Oral Tab Take 1 tablet by m No date: ORAL SURGERY PROCEDURE  No date: TUBAL LIGATION    Social History  Social History   Marital status:   Spouse name: N/A    Years of education: N/A  Number of children: 3     Occupational History  [employer] executive  retired   AAA Pulmonary/Chest: Effort normal and breath sounds normal. No respiratory distress. She has no wheezes. Abdominal: Soft. Bowel sounds are normal. She exhibits no distension and no mass. There is no tenderness. Musculoskeletal: Normal range of motion. Patient has some symptoms suggestive of peripheral neuropathy which may be related to her chemotherapy for her ovarian carcinoma. 30 total minutes spent with patient >50% of visit was spent in counseling and coordination of care.     Results:  Rayray 5.0 - 8.0 6.0   PROTEIN (URINE DIPSTICK)      Negative mg/dL Negative   GLUCOSE (URINE DIPSTICK)      Negative mg/dL Negative   KETONES (URINE DIPSTICK)      Negative mg/dL Negative   BILIRUBIN      Negative Negative   OCCULT BLOOD      Negative Negati 9/04/2013, 11:28. [facility], [facility]. [facility], [facility] DIGITAL SCREEN W CAD PF, 2/12/2016, 13:45. [facility], [facility]. [facility], [facility] DIGITAL ADDITIONAL VIEW RT, 2/24/2016, 14:08.   [facility], [facility], [facility] DIGITAL UNI RT,   3/09/ INDICATIONS:            Ms. Mildred Wylie, who is 80 years with history of right breast malignancy status post mastectomy 4/27/16, presents for followup breast MRI to reassess a probably benign area of 2 foci in the left breast 5:00 position seen on the patient's M PLEASE NOTE:  A NORMAL MRI DOES NOT EXCLUDE THE POSSIBILITY OF BREAST CANCER.   A CLINICALLY SUSPICIOUS PALPABLE LUMP SHOULD BE BIOPSIED.              PROCEDURE:   MRI BREAST (W+WO) W/CAD BILAT (CPT=77059/0159T)      [facility] [place] 1. The previously seen probably benign foci in the left breast 5:00 position are much less conspicuous than that seen in the prior MRI. There are no suspicious areas of enhancement in the left breast.      2. Status post right breast mastectomy.  No suspici following should be considered for treatment:       A hip or vertebral (clinical or morphometric) fracture       T score < -2.5 at the femoral neck or spine after appropriate  evaluation to exclude secondary causes.       Low bone mass (T score between -1.

## 2018-06-27 ENCOUNTER — OFFICE VISIT (OUTPATIENT)
Dept: INTERNAL MEDICINE CLINIC | Facility: CLINIC | Age: 83
End: 2018-06-27

## 2018-06-27 VITALS
DIASTOLIC BLOOD PRESSURE: 84 MMHG | WEIGHT: 132 LBS | HEIGHT: 63 IN | BODY MASS INDEX: 23.39 KG/M2 | SYSTOLIC BLOOD PRESSURE: 138 MMHG | RESPIRATION RATE: 16 BRPM | HEART RATE: 64 BPM

## 2018-06-27 DIAGNOSIS — I10 ESSENTIAL HYPERTENSION WITH GOAL BLOOD PRESSURE LESS THAN 140/90: Primary | ICD-10-CM

## 2018-06-27 DIAGNOSIS — E78.2 MIXED HYPERLIPIDEMIA: ICD-10-CM

## 2018-06-27 DIAGNOSIS — E03.9 HYPOTHYROIDISM, UNSPECIFIED TYPE: ICD-10-CM

## 2018-06-27 DIAGNOSIS — C50.011 MALIGNANT NEOPLASM OF AREOLA OF RIGHT BREAST IN FEMALE, UNSPECIFIED ESTROGEN RECEPTOR STATUS (HCC): ICD-10-CM

## 2018-06-27 PROCEDURE — 99212 OFFICE O/P EST SF 10 MIN: CPT | Performed by: INTERNAL MEDICINE

## 2018-06-27 PROCEDURE — 99214 OFFICE O/P EST MOD 30 MIN: CPT | Performed by: INTERNAL MEDICINE

## 2018-06-27 RX ORDER — LOVASTATIN 40 MG/1
TABLET ORAL
Qty: 90 TABLET | Refills: 0 | Status: SHIPPED | OUTPATIENT
Start: 2018-06-27 | End: 2018-11-01

## 2018-06-27 NOTE — PROGRESS NOTES
Raisa Herrera is a 80year old female. HPI:     1. Essential hypertension with goal blood pressure less than 140/90    Patient has been following low salt diet and has been taking anti-hypertensive prescriptions as prescribed.  Blood pressure has been check type, carcinoma is present at less than 0.5 mm from the deep tissure edge   • Dyslipidemia    • Epiretinal membrane (ERM) of right eye    • Hypertension    • Hypothyroidism 1990   • Malignant neoplasm of overlapping sites of right breast in female, estroge weekly will help to curb one's appetite, control weight and lead to better blood pressure control.     2. Mixed Hyperlipidemia     Patient instructed to take cholesterol lowering medications as prescribed and to continue to follow a low fat, low cholesterol

## 2018-07-16 ENCOUNTER — TELEPHONE (OUTPATIENT)
Dept: INTERNAL MEDICINE CLINIC | Facility: CLINIC | Age: 83
End: 2018-07-16

## 2018-07-16 DIAGNOSIS — Z01.00 EYE EXAM, ROUTINE: Primary | ICD-10-CM

## 2018-07-16 NOTE — TELEPHONE ENCOUNTER
[facility] dr[facility] Nathaniel Ramires.  Patient wants a call once the referral is in

## 2018-07-20 ENCOUNTER — MED REC SCAN ONLY (OUTPATIENT)
Dept: INTERNAL MEDICINE CLINIC | Facility: CLINIC | Age: 83
End: 2018-07-20

## 2018-07-24 ENCOUNTER — HOSPITAL ENCOUNTER (OUTPATIENT)
Dept: MAMMOGRAPHY | Facility: HOSPITAL | Age: 83
Discharge: HOME | End: 2018-07-24
Attending: INTERNAL MEDICINE
Payer: COMMERCIAL

## 2018-07-24 DIAGNOSIS — C50.811 MALIGNANT NEOPLASM OF OVERLAPPING SITES OF RIGHT BREAST IN FEMALE, ESTROGEN RECEPTOR POSITIVE (HCC): ICD-10-CM

## 2018-07-24 DIAGNOSIS — Z51.81 ENCOUNTER FOR MONITORING AROMATASE INHIBITOR THERAPY: ICD-10-CM

## 2018-07-24 DIAGNOSIS — Z17.0 MALIGNANT NEOPLASM OF OVERLAPPING SITES OF RIGHT BREAST IN FEMALE, ESTROGEN RECEPTOR POSITIVE (HCC): ICD-10-CM

## 2018-07-24 DIAGNOSIS — Z79.811 ENCOUNTER FOR MONITORING AROMATASE INHIBITOR THERAPY: ICD-10-CM

## 2018-07-24 PROCEDURE — 77063 BREAST TOMOSYNTHESIS BI: CPT | Performed by: INTERNAL MEDICINE

## 2018-07-24 PROCEDURE — 77067 SCR MAMMO BI INCL CAD: CPT | Performed by: INTERNAL MEDICINE

## 2018-08-07 ENCOUNTER — NURSE TRIAGE (OUTPATIENT)
Dept: INTERNAL MEDICINE CLINIC | Facility: CLINIC | Age: 83
End: 2018-08-07

## 2018-08-07 NOTE — TELEPHONE ENCOUNTER
Action Requested: Summary for Provider     []  Critical Lab, Recommendations Needed  [x] Need Additional Advice  []   FYI    []   Need Orders  [] Need Medications Sent to Pharmacy  []  Other     SUMMARY: PT REFUSING ER, leg edema, pain    Pt states she was

## 2018-08-08 ENCOUNTER — APPOINTMENT (OUTPATIENT)
Dept: ULTRASOUND IMAGING | Facility: HOSPITAL | Age: 83
End: 2018-08-08
Attending: EMERGENCY MEDICINE
Payer: COMMERCIAL

## 2018-08-08 ENCOUNTER — APPOINTMENT (OUTPATIENT)
Dept: GENERAL RADIOLOGY | Facility: HOSPITAL | Age: 83
End: 2018-08-08
Attending: EMERGENCY MEDICINE
Payer: COMMERCIAL

## 2018-08-08 ENCOUNTER — HOSPITAL ENCOUNTER (EMERGENCY)
Facility: HOSPITAL | Age: 83
Discharge: HOME | End: 2018-08-08
Attending: EMERGENCY MEDICINE
Payer: COMMERCIAL

## 2018-08-08 VITALS
BODY MASS INDEX: 23.04 KG/M2 | RESPIRATION RATE: 18 BRPM | WEIGHT: 130 LBS | HEART RATE: 60 BPM | SYSTOLIC BLOOD PRESSURE: 144 MMHG | HEIGHT: 63 IN | OXYGEN SATURATION: 99 % | TEMPERATURE: 99 F | DIASTOLIC BLOOD PRESSURE: 64 MMHG

## 2018-08-08 DIAGNOSIS — M79.604 PAIN OF RIGHT LOWER EXTREMITY: Primary | ICD-10-CM

## 2018-08-08 PROCEDURE — 93971 EXTREMITY STUDY: CPT | Performed by: EMERGENCY MEDICINE

## 2018-08-08 PROCEDURE — 73590 X-RAY EXAM OF LOWER LEG: CPT | Performed by: EMERGENCY MEDICINE

## 2018-08-08 PROCEDURE — 99284 EMERGENCY DEPT VISIT MOD MDM: CPT

## 2018-08-08 NOTE — TELEPHONE ENCOUNTER
I am leaving town this AM for 10 days. If swelling /pain remains I believe patient should be seen and evaluated in ER with a history of cancer to make sure no clot exists after such long car ride following trauma.

## 2018-08-08 NOTE — ED NOTES
Pt alert and interactive. Complains of RLE discomfort and swelling after a fall in the woods 10 days. Skin intact. Neuro intact. Full ROM noted. Non-pitting edema noted. Pt does report she hit her RLE on a log during the fall. Pt is able to bear weight.  Sp

## 2018-08-08 NOTE — ED INITIAL ASSESSMENT (HPI)
Right leg tenderness and swelling luna the past 10days. Was picking blueberries in [place] ago and fell injuring right leg. Had 11+ hr car ride home and pain in is persistent.

## 2018-08-08 NOTE — ED PROVIDER NOTES
Patient Seen in: [facility] Emergency Department    History   Patient presents with:  Lower Extremity Injury (musculoskeletal)    Stated Complaint: right leg swelling     HPI    78-year-old female with history of hypertension, hypothyroidism, and OD       Past Surgical History:  2006: BILAT SALPINGO-OOPHORECT W/ OMENTECT, TOTAL AB*  3/9/16: BIOPSY OF BREAST, NEEDLE CORE Right  4/7/14: CATARACT EXTRACTION W/  INTRAOCULAR LENS IMPLA* Right      Comment: ABDON  1/20/14: CATARACT EXTRACTION W/  Don[place]on Door leg.  No knee, ankle, or foot tenderness noted. Bilateral dorsalis pedis pulses intact and symmetric.   Psychiatric: patient is oriented X 3, there is no agitation        ED Course   Labs Reviewed - No data to display    ED Course as of Aug 08 1417  ------

## 2018-08-14 RX ORDER — AMLODIPINE BESYLATE 10 MG/1
TABLET ORAL
Qty: 90 TABLET | Refills: 0 | Status: SHIPPED | OUTPATIENT
Start: 2018-08-14 | End: 2018-10-02

## 2018-08-14 RX ORDER — LEVOTHYROXINE SODIUM 112 UG/1
TABLET ORAL
Qty: 90 TABLET | Refills: 0 | Status: SHIPPED | OUTPATIENT
Start: 2018-08-14 | End: 2018-10-02

## 2018-08-15 NOTE — TELEPHONE ENCOUNTER
Hypertensive Medications  Protocol Criteria:  · Appointment scheduled in the past 6 months or in the next 3 months  · BMP or CMP in the past 12 months  · Creatinine result < 2  Recent Outpatient Visits            1 month ago Essential hypertension with Becky Medrano scheduled in the past 12 months or the next 3 months  TSH resulted in the past 12 months that is normal  Recent Outpatient Visits            1 month ago Essential hypertension with goal blood pressure less than 140/90    [facility], [facility], [address]

## 2018-08-20 ENCOUNTER — OFFICE VISIT (OUTPATIENT)
Dept: DERMATOLOGY CLINIC | Facility: CLINIC | Age: 83
End: 2018-08-20

## 2018-08-20 DIAGNOSIS — L82.1 SEBORRHEIC KERATOSES: ICD-10-CM

## 2018-08-20 DIAGNOSIS — D23.4 BENIGN NEOPLASM OF SCALP AND SKIN OF NECK: ICD-10-CM

## 2018-08-20 DIAGNOSIS — Z85.828 PERSONAL HISTORY OF SKIN CANCER: Primary | ICD-10-CM

## 2018-08-20 DIAGNOSIS — D23.5 BENIGN NEOPLASM OF SKIN OF TRUNK, EXCEPT SCROTUM: ICD-10-CM

## 2018-08-20 DIAGNOSIS — D23.70 BENIGN NEOPLASM OF SKIN OF LOWER EXTREMITY, INCLUDING HIP, UNSPECIFIED LATERALITY: ICD-10-CM

## 2018-08-20 DIAGNOSIS — D23.60 BENIGN NEOPLASM OF SKIN OF UPPER EXTREMITY AND SHOULDER, UNSPECIFIED LATERALITY: ICD-10-CM

## 2018-08-20 DIAGNOSIS — D23.30 BENIGN NEOPLASM OF SKIN OF FACE: ICD-10-CM

## 2018-08-20 DIAGNOSIS — L57.8 SUN-DAMAGED SKIN: ICD-10-CM

## 2018-08-20 DIAGNOSIS — L81.4 SOLAR LENTIGO: ICD-10-CM

## 2018-08-20 DIAGNOSIS — Z87.2 HISTORY OF ACTINIC KERATOSES: ICD-10-CM

## 2018-08-20 PROCEDURE — 99212 OFFICE O/P EST SF 10 MIN: CPT | Performed by: DERMATOLOGY

## 2018-08-20 PROCEDURE — 99213 OFFICE O/P EST LOW 20 MIN: CPT | Performed by: DERMATOLOGY

## 2018-08-20 NOTE — PROGRESS NOTES
HPI:     Chief Complaint     Actinic Keratosis        HPI     Actinic Keratosis    Additional comments: LOV: 2-26-18. Pt presents today for 6 month f/u full body skin eval pt denies something new or changing she notcied. Pt with personal hx of BCC, Ak's. LEVOTHYROXINE SODIUM 112 MCG Oral Tab TAKE 1 TABLET BY MOUTH DAILY BEFORE BREAKFAST Disp: 90 tablet Rfl: 0     Allergies:   No Known Allergies    Past Medical History:   Diagnosis Date   • Basal cell carcinoma 11/2017    right eyelid   • Breast cancer (H Used    Alcohol use Yes    Comment: rarely    Drug use: No    Sexual activity: Not on file     Other Topics Concern    Caffeine Concern Yes    Comment: coffee, 1 cup    Pt has a pacemaker No    Pt has a defibrillator No    Reaction to local anesthetic No checks. She will come sooner if notes anything new or changing. Sun-damaged skin-proper use and application of broad-spectrum sunblock SPF 30 or higher discussed.   Patient understands to put it on  15-20 minutes before outsides so that  it is absorbed an

## 2018-08-20 NOTE — PROGRESS NOTES
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 Elida Pelletier - Basal cell carcinoma- nodular type, carcinoma is present at less joanne coffee, 1 cup    Pt has a pacemaker No    Pt has a defibrillator No    Reaction to local anesthetic No     Social History Narrative   None on file     Family History   Problem Relation Age of Onset   • AAA [OTHER] Father      AAA   • Hypertension Sister

## 2018-09-14 ENCOUNTER — TELEPHONE (OUTPATIENT)
Dept: INTERNAL MEDICINE CLINIC | Facility: CLINIC | Age: 83
End: 2018-09-14

## 2018-09-14 NOTE — TELEPHONE ENCOUNTER
Pt called in requesting to have her referral dated 6/27/18 for her swim form to be faxed directly to [employer] at fax: [phone], ph: [phone]. Please advise pt of confirmation.

## 2018-10-02 ENCOUNTER — OFFICE VISIT (OUTPATIENT)
Dept: INTERNAL MEDICINE CLINIC | Facility: CLINIC | Age: 83
End: 2018-10-02

## 2018-10-02 VITALS
SYSTOLIC BLOOD PRESSURE: 140 MMHG | HEART RATE: 64 BPM | BODY MASS INDEX: 22.32 KG/M2 | RESPIRATION RATE: 16 BRPM | WEIGHT: 126 LBS | DIASTOLIC BLOOD PRESSURE: 74 MMHG | HEIGHT: 63 IN

## 2018-10-02 DIAGNOSIS — I10 ESSENTIAL HYPERTENSION WITH GOAL BLOOD PRESSURE LESS THAN 140/90: Primary | ICD-10-CM

## 2018-10-02 DIAGNOSIS — E03.9 ACQUIRED HYPOTHYROIDISM: ICD-10-CM

## 2018-10-02 DIAGNOSIS — Z23 NEED FOR VACCINATION: ICD-10-CM

## 2018-10-02 DIAGNOSIS — E78.2 MIXED HYPERLIPIDEMIA: ICD-10-CM

## 2018-10-02 PROCEDURE — 90471 IMMUNIZATION ADMIN: CPT | Performed by: INTERNAL MEDICINE

## 2018-10-02 PROCEDURE — 99214 OFFICE O/P EST MOD 30 MIN: CPT | Performed by: INTERNAL MEDICINE

## 2018-10-02 PROCEDURE — 99212 OFFICE O/P EST SF 10 MIN: CPT | Performed by: INTERNAL MEDICINE

## 2018-10-02 PROCEDURE — 90653 IIV ADJUVANT VACCINE IM: CPT | Performed by: INTERNAL MEDICINE

## 2018-10-02 NOTE — PROGRESS NOTES
Denise Woo is a 80year old female. HPI:     1. Essential hypertension with goal blood pressure less than 140/90    Patient has been following low salt diet and has been taking anti-hypertensive prescriptions as prescribed.  Blood pressure has been check type, carcinoma is present at less than 0.5 mm from the deep tissure edge   • Dyslipidemia    • Epiretinal membrane (ERM) of right eye    • Hypertension    • Hypothyroidism 1990   • Malignant neoplasm of overlapping sites of right breast in female, estroge one's appetite, control weight and lead to better blood pressure control. BP running a little high. Check BP at home and follow up if >140/90    - CBC WITH DIFFERENTIAL WITH PLATELET; Future  - COMP METABOLIC PANEL (14); Future  - URINALYSIS, ROUTINE;  Futu

## 2018-11-02 RX ORDER — AMLODIPINE BESYLATE 10 MG/1
TABLET ORAL
Qty: 90 TABLET | Refills: 0 | Status: SHIPPED | OUTPATIENT
Start: 2018-11-02 | End: 2019-01-22

## 2018-11-02 RX ORDER — LOVASTATIN 40 MG/1
TABLET ORAL
Qty: 90 TABLET | Refills: 0 | Status: SHIPPED | OUTPATIENT
Start: 2018-11-02 | End: 2019-01-22

## 2018-11-02 RX ORDER — LEVOTHYROXINE SODIUM 112 UG/1
TABLET ORAL
Qty: 90 TABLET | Refills: 0 | Status: SHIPPED | OUTPATIENT
Start: 2018-11-02 | End: 2019-01-22

## 2018-11-02 RX ORDER — METOPROLOL TARTRATE 50 MG/1
TABLET, FILM COATED ORAL
Qty: 180 TABLET | Refills: 0 | Status: SHIPPED | OUTPATIENT
Start: 2018-11-02 | End: 2019-01-22

## 2018-11-21 ENCOUNTER — LAB ENCOUNTER (OUTPATIENT)
Dept: LAB | Age: 83
End: 2018-11-21
Attending: INTERNAL MEDICINE
Payer: COMMERCIAL

## 2018-11-21 DIAGNOSIS — I10 ESSENTIAL HYPERTENSION WITH GOAL BLOOD PRESSURE LESS THAN 140/90: ICD-10-CM

## 2018-11-21 DIAGNOSIS — E78.2 MIXED HYPERLIPIDEMIA: ICD-10-CM

## 2018-11-21 PROCEDURE — 84443 ASSAY THYROID STIM HORMONE: CPT

## 2018-11-21 PROCEDURE — 36415 COLL VENOUS BLD VENIPUNCTURE: CPT

## 2018-11-21 PROCEDURE — 81001 URINALYSIS AUTO W/SCOPE: CPT

## 2018-11-21 PROCEDURE — 80061 LIPID PANEL: CPT

## 2018-11-21 PROCEDURE — 85025 COMPLETE CBC W/AUTO DIFF WBC: CPT

## 2018-11-21 PROCEDURE — 80053 COMPREHEN METABOLIC PANEL: CPT

## 2018-11-28 DIAGNOSIS — E03.9 ACQUIRED HYPOTHYROIDISM: Primary | ICD-10-CM

## 2018-12-26 ENCOUNTER — HOSPITAL ENCOUNTER (EMERGENCY)
Facility: HOSPITAL | Age: 83
Discharge: HOME | End: 2018-12-26
Payer: COMMERCIAL

## 2018-12-26 ENCOUNTER — APPOINTMENT (OUTPATIENT)
Dept: GENERAL RADIOLOGY | Facility: HOSPITAL | Age: 83
End: 2018-12-26
Payer: COMMERCIAL

## 2018-12-26 VITALS
BODY MASS INDEX: 22.15 KG/M2 | TEMPERATURE: 99 F | SYSTOLIC BLOOD PRESSURE: 179 MMHG | OXYGEN SATURATION: 96 % | HEART RATE: 63 BPM | RESPIRATION RATE: 18 BRPM | DIASTOLIC BLOOD PRESSURE: 76 MMHG | HEIGHT: 63 IN | WEIGHT: 125 LBS

## 2018-12-26 DIAGNOSIS — S42.295A OTHER CLOSED NONDISPLACED FRACTURE OF PROXIMAL END OF LEFT HUMERUS, INITIAL ENCOUNTER: Primary | ICD-10-CM

## 2018-12-26 PROCEDURE — 99283 EMERGENCY DEPT VISIT LOW MDM: CPT

## 2018-12-26 PROCEDURE — 73060 X-RAY EXAM OF HUMERUS: CPT

## 2018-12-26 NOTE — ED INITIAL ASSESSMENT (HPI)
Pt presents with left forearm pain s/p tripping and falling inside the house yesterday. Pt denies hitting head.

## 2018-12-27 ENCOUNTER — TELEPHONE (OUTPATIENT)
Dept: CASE MANAGEMENT | Age: 83
End: 2018-12-27

## 2018-12-27 DIAGNOSIS — S42.201A CLOSED FRACTURE OF PROXIMAL END OF RIGHT HUMERUS, UNSPECIFIED FRACTURE MORPHOLOGY, INITIAL ENCOUNTER: Primary | ICD-10-CM

## 2018-12-27 NOTE — TELEPHONE ENCOUNTER
Hi Dr. Tawny Aschoff was see in the ED yesterday and she has a fracture. They would like her to follow up with Dr. Giorgi Wright.     If you agree can you please sign referral.      Thank you  Anabel thompson

## 2018-12-27 NOTE — ED PROVIDER NOTES
Patient Seen in: [facility] AND [facility] Emergency Department    History   No chief complaint on file. Stated Complaint: Upper Extermity Injury (Right shoulder and arm).  \" I had on the wrong shoes and*    HPI    HPI: Virginia Muñoz is a 80year old female wh ORAL SURGERY PROCEDURE     • TUBAL LIGATION         Medications :   LEVOTHYROXINE SODIUM 112 MCG Oral Tab,  TAKE 1 TABLET BY MOUTH DAILY BEFORE BREAKFAST   AMLODIPINE BESYLATE 10 MG Oral Tab,  TAKE 1 TABLET BY MOUTH DAILY   METOPROLOL TARTRATE 50 MG Oral T and agreed except as otherwise stated in HPI.     Physical Exam     ED Triage Vitals [12/26/18 1248]   BP (!) 188/74   Pulse 69   Resp 18   Temp 98.5 °F (36.9 °C)   Temp src Oral   SpO2 96 %   O2 Device None (Room air)       Current:BP (!) 179/76   Pulse 63 Plan     Clinical Impression:  Other closed nondisplaced fracture of proximal end of left humerus, initial encounter  (primary encounter diagnosis)    Disposition:  Discharge    Follow-up:  Edison Sethi MD  [facility] [address] [address] [id number]

## 2019-01-10 ENCOUNTER — OFFICE VISIT (OUTPATIENT)
Dept: ORTHOPEDICS CLINIC | Facility: CLINIC | Age: 84
End: 2019-01-10

## 2019-01-10 ENCOUNTER — APPOINTMENT (OUTPATIENT)
Dept: LAB | Facility: HOSPITAL | Age: 84
End: 2019-01-10
Attending: INTERNAL MEDICINE
Payer: COMMERCIAL

## 2019-01-10 ENCOUNTER — HOSPITAL ENCOUNTER (OUTPATIENT)
Dept: GENERAL RADIOLOGY | Facility: HOSPITAL | Age: 84
Discharge: HOME | End: 2019-01-10
Attending: ORTHOPAEDIC SURGERY
Payer: COMMERCIAL

## 2019-01-10 DIAGNOSIS — M25.512 ACUTE PAIN OF LEFT SHOULDER: ICD-10-CM

## 2019-01-10 DIAGNOSIS — S42.295D OTHER CLOSED NONDISPLACED FRACTURE OF PROXIMAL END OF LEFT HUMERUS WITH ROUTINE HEALING, SUBSEQUENT ENCOUNTER: ICD-10-CM

## 2019-01-10 DIAGNOSIS — S42.295D OTHER CLOSED NONDISPLACED FRACTURE OF PROXIMAL END OF LEFT HUMERUS WITH ROUTINE HEALING, SUBSEQUENT ENCOUNTER: Primary | ICD-10-CM

## 2019-01-10 DIAGNOSIS — E03.9 ACQUIRED HYPOTHYROIDISM: ICD-10-CM

## 2019-01-10 PROBLEM — S42.202D CLOSED FRACTURE OF PROXIMAL END OF LEFT HUMERUS WITH ROUTINE HEALING: Status: ACTIVE | Noted: 2019-01-10

## 2019-01-10 LAB — TSH SERPL-ACNC: 0.38 UIU/ML (ref 0.45–5.33)

## 2019-01-10 PROCEDURE — 99212 OFFICE O/P EST SF 10 MIN: CPT | Performed by: ORTHOPAEDIC SURGERY

## 2019-01-10 PROCEDURE — 73030 X-RAY EXAM OF SHOULDER: CPT | Performed by: ORTHOPAEDIC SURGERY

## 2019-01-10 PROCEDURE — 84443 ASSAY THYROID STIM HORMONE: CPT

## 2019-01-10 PROCEDURE — 99203 OFFICE O/P NEW LOW 30 MIN: CPT | Performed by: ORTHOPAEDIC SURGERY

## 2019-01-10 PROCEDURE — 23600 CLTX PROX HUMRL FX W/O MNPJ: CPT | Performed by: ORTHOPAEDIC SURGERY

## 2019-01-10 PROCEDURE — 36415 COLL VENOUS BLD VENIPUNCTURE: CPT

## 2019-01-22 ENCOUNTER — OFFICE VISIT (OUTPATIENT)
Dept: INTERNAL MEDICINE CLINIC | Facility: CLINIC | Age: 84
End: 2019-01-22

## 2019-01-22 VITALS
WEIGHT: 125 LBS | DIASTOLIC BLOOD PRESSURE: 78 MMHG | RESPIRATION RATE: 16 BRPM | HEART RATE: 78 BPM | HEIGHT: 63 IN | SYSTOLIC BLOOD PRESSURE: 136 MMHG | BODY MASS INDEX: 22.15 KG/M2

## 2019-01-22 DIAGNOSIS — E03.9 ACQUIRED HYPOTHYROIDISM: ICD-10-CM

## 2019-01-22 DIAGNOSIS — E78.2 MIXED HYPERLIPIDEMIA: ICD-10-CM

## 2019-01-22 DIAGNOSIS — S42.272D CLOSED TORUS FRACTURE OF PROXIMAL END OF LEFT HUMERUS WITH ROUTINE HEALING, SUBSEQUENT ENCOUNTER: ICD-10-CM

## 2019-01-22 DIAGNOSIS — I10 ESSENTIAL HYPERTENSION WITH GOAL BLOOD PRESSURE LESS THAN 140/90: Primary | ICD-10-CM

## 2019-01-22 PROCEDURE — 99212 OFFICE O/P EST SF 10 MIN: CPT | Performed by: INTERNAL MEDICINE

## 2019-01-22 PROCEDURE — 99214 OFFICE O/P EST MOD 30 MIN: CPT | Performed by: INTERNAL MEDICINE

## 2019-01-22 RX ORDER — LEVOTHYROXINE SODIUM 112 UG/1
TABLET ORAL
Qty: 90 TABLET | Refills: 0 | Status: SHIPPED | OUTPATIENT
Start: 2019-01-22 | End: 2019-01-22

## 2019-01-22 RX ORDER — METOPROLOL TARTRATE 50 MG/1
TABLET, FILM COATED ORAL
Qty: 180 TABLET | Refills: 0 | Status: SHIPPED | OUTPATIENT
Start: 2019-01-22 | End: 2019-04-12

## 2019-01-22 RX ORDER — LOVASTATIN 40 MG/1
TABLET ORAL
Qty: 90 TABLET | Refills: 0 | Status: SHIPPED | OUTPATIENT
Start: 2019-01-22 | End: 2019-04-12

## 2019-01-22 RX ORDER — AMLODIPINE BESYLATE 10 MG/1
TABLET ORAL
Qty: 90 TABLET | Refills: 0 | Status: SHIPPED | OUTPATIENT
Start: 2019-01-22 | End: 2019-12-23

## 2019-01-22 NOTE — PROGRESS NOTES
Brittni Sousa is a 80year old female. HPI:     1. Essential hypertension with goal blood pressure less than 140/90    Patient has been following low salt diet and has been taking anti-hypertensive prescriptions as prescribed.  Blood pressure has been check (ONE-DAILY MULTI VITAMINS) Oral Tab Take 1 tablet by mouth daily. Disp:  Rfl:    Cholecalciferol (VITAMIN D) 1000 UNITS Oral Cap Take 1,000 Units by mouth daily.  Disp:  Rfl:       Past Medical History:   Diagnosis Date   • Basal cell carcinoma 11/2017    r auscultation  CARDIO: RRR without murmur  GI: good BS's,no masses, HSM or tenderness  EXTREMITIES: no cyanosis, clubbing or edema  Has sling on left upper arm. ASSESSMENT AND PLAN:     1.  Essential hypertension  with goal blood pressure less than 140/90

## 2019-01-22 NOTE — TELEPHONE ENCOUNTER
Doing well since surgery. Has tenderness where sutures are located.     Exam: wounds well approximated, sutures out, minimal swelling.  Sensation intact distal to incisions.    P:  1.  Discussed surgical findings and expectations  2.  Elevation as needed for swelling  3.  Figure 8 Coban wrap to the right great toe, may remove for swelling  4.  Scar massage as needed.   5.  May soak foot in the next 1-2 weeks   6.  Begin stretching the right hallux   7.  RTC as needed    Collaborative physician for this date and note: Dr. Cruzito Fernandez MD.        Refill passed per [facility] protocol.   Hypertensive Medications  Protocol Criteria:  · Appointment scheduled in the past 6 months or in the next 3 months  · BMP or CMP in the past 12 months  · Creatinine result < 2  Recent Outpatient Visits 04/25/2016    [facility] 8 11/21/2018    [facility] 292 11/21/2018     Cholesterol Medications  Protocol Criteria:  · Appointment scheduled in the past 12 months or in the next 3 months  · ALT & LDL on file in the past 12 months  · ALT result < 80  · LDL resul subsequent encounter    [facility] for Vennie Meckel, MD    Office Visit    3 months ago Essential hypertension with goal blood pressure less than 140/90    [facility], [facility], [address] [facility] [address],

## 2019-01-24 ENCOUNTER — OFFICE VISIT (OUTPATIENT)
Dept: ORTHOPEDICS CLINIC | Facility: CLINIC | Age: 84
End: 2019-01-24

## 2019-01-24 ENCOUNTER — HOSPITAL ENCOUNTER (OUTPATIENT)
Dept: GENERAL RADIOLOGY | Facility: HOSPITAL | Age: 84
Discharge: HOME | End: 2019-01-24
Attending: ORTHOPAEDIC SURGERY
Payer: COMMERCIAL

## 2019-01-24 DIAGNOSIS — Z47.89 ORTHOPEDIC AFTERCARE: ICD-10-CM

## 2019-01-24 DIAGNOSIS — S42.295D OTHER CLOSED NONDISPLACED FRACTURE OF PROXIMAL END OF LEFT HUMERUS WITH ROUTINE HEALING, SUBSEQUENT ENCOUNTER: ICD-10-CM

## 2019-01-24 DIAGNOSIS — Z47.89 ORTHOPEDIC AFTERCARE: Primary | ICD-10-CM

## 2019-01-24 PROCEDURE — 73030 X-RAY EXAM OF SHOULDER: CPT | Performed by: ORTHOPAEDIC SURGERY

## 2019-01-24 PROCEDURE — 99024 POSTOP FOLLOW-UP VISIT: CPT | Performed by: ORTHOPAEDIC SURGERY

## 2019-01-24 PROCEDURE — 99212 OFFICE O/P EST SF 10 MIN: CPT | Performed by: ORTHOPAEDIC SURGERY

## 2019-01-25 NOTE — PROGRESS NOTES
1/24/2019  Kristofer More  5/30/1930  80year old   female  Dee Garcia MD    HPI:   Patient presents with:  Fracture: F/u on left shoulder fracture. Stts she wears her sling from 11am-8pm.  Denies any numbness or tingling in the shoulder.         The p left shoulder Grashey, outlet, and axillary lateral views. No orders of the defined types were placed in this encounter.

## 2019-02-07 ENCOUNTER — HOSPITAL ENCOUNTER (OUTPATIENT)
Dept: GENERAL RADIOLOGY | Facility: HOSPITAL | Age: 84
Discharge: HOME | End: 2019-02-07
Attending: ORTHOPAEDIC SURGERY
Payer: COMMERCIAL

## 2019-02-07 ENCOUNTER — OFFICE VISIT (OUTPATIENT)
Dept: ORTHOPEDICS CLINIC | Facility: CLINIC | Age: 84
End: 2019-02-07

## 2019-02-07 DIAGNOSIS — S42.295D OTHER CLOSED NONDISPLACED FRACTURE OF PROXIMAL END OF LEFT HUMERUS WITH ROUTINE HEALING, SUBSEQUENT ENCOUNTER: ICD-10-CM

## 2019-02-07 DIAGNOSIS — Z47.89 ORTHOPEDIC AFTERCARE: ICD-10-CM

## 2019-02-07 DIAGNOSIS — Z47.89 ORTHOPEDIC AFTERCARE: Primary | ICD-10-CM

## 2019-02-07 PROCEDURE — 73030 X-RAY EXAM OF SHOULDER: CPT | Performed by: ORTHOPAEDIC SURGERY

## 2019-02-07 PROCEDURE — 99024 POSTOP FOLLOW-UP VISIT: CPT | Performed by: ORTHOPAEDIC SURGERY

## 2019-02-07 PROCEDURE — 99212 OFFICE O/P EST SF 10 MIN: CPT | Performed by: ORTHOPAEDIC SURGERY

## 2019-02-07 NOTE — PROGRESS NOTES
2/7/2019  [facility]  5/30/1930  80year old   female  Aly Brush MD    HPI:   Patient presents with:  Fracture: Here to follow up on her fractured left shoulder. Denies any numbness, swelling or tingling. Stts she has not been using her sling. therapy     All of their questions were answered and they are in agreement with the treatment plan.     The patient will return to clinic in 6 weeks for further clinical and radiographic evaluation with left shoulder Grashey, outlet and axillary lateral vie

## 2019-02-20 ENCOUNTER — OFFICE VISIT (OUTPATIENT)
Dept: PHYSICAL THERAPY | Facility: HOSPITAL | Age: 84
End: 2019-02-20
Attending: ORTHOPAEDIC SURGERY
Payer: COMMERCIAL

## 2019-02-20 DIAGNOSIS — S42.295D OTHER CLOSED NONDISPLACED FRACTURE OF PROXIMAL END OF LEFT HUMERUS WITH ROUTINE HEALING, SUBSEQUENT ENCOUNTER: ICD-10-CM

## 2019-02-20 DIAGNOSIS — Z47.89 ORTHOPEDIC AFTERCARE: ICD-10-CM

## 2019-02-20 PROCEDURE — 97161 PT EVAL LOW COMPLEX 20 MIN: CPT

## 2019-02-20 PROCEDURE — 97110 THERAPEUTIC EXERCISES: CPT

## 2019-02-20 NOTE — PROGRESS NOTES
UPPER EXTREMITY EVALUATION:   Referring Physician: Dr. Timothy Coombs  Date of Onset: 12/25/19 Date of Service: 2/20/2019   Diagnosis: Z47.89 (ICD-10-CM) - V54.9 (ICD-9-CM) - Orthopedic aftercare  S42.295D (ICD-10-CM) - V54.11 (ICD-9-CM) - Other closed nondis Brenda Sarmiento is a 80year old y/o female who presents to therapy today with complaints of 2-3/10 pain at left proximal shoulder, bruising and tenderness  at distal humerus, elbow. Increased with picking up object with LUE or reaching to groom hair.  Pain Accessory motion: decreased scapular mobility all directions left and decreased humeral head posterior glide    Flexibility: pectoralis major and minor tightness bilateral, both upper trap tightness    Strength/MMT:  Shoulder Elbow Scapular   Flexion: R 4+ · Pt will be independent and compliant with comprehensive HEP to maintain progress achieved in PT          Frequency / Duration: Patient will be seen for 2 x/week or a total of 10 visits over a 90 day period.   Treatment will include: modalities as indicate

## 2019-02-27 ENCOUNTER — OFFICE VISIT (OUTPATIENT)
Dept: PHYSICAL THERAPY | Facility: HOSPITAL | Age: 84
End: 2019-02-27
Attending: ORTHOPAEDIC SURGERY
Payer: COMMERCIAL

## 2019-02-27 PROCEDURE — 97110 THERAPEUTIC EXERCISES: CPT

## 2019-02-27 PROCEDURE — 97140 MANUAL THERAPY 1/> REGIONS: CPT

## 2019-02-27 NOTE — PROGRESS NOTES
Diagnosis:Z47.89 (ICD-10-CM) - V54.9 (ICD-9-CM) - Orthopedic aftercare  S42.295D (ICD-10-CM) - V54.11 (ICD-9-CM) - Other closed nondisplaced fracture of proximal end of left humerus with routine healing, subsequent encounter    Authorized # of Visits:  10 to improve function with ADLs including lifting groceries opening and closing doors   · Pt will demonstrate increased mid/low trap strength to 4+/5 to promote improved shoulder mechanics and stabilization with ADL such as lifting and reaching  · Patient wk

## 2019-03-04 ENCOUNTER — OFFICE VISIT (OUTPATIENT)
Dept: PHYSICAL THERAPY | Facility: HOSPITAL | Age: 84
End: 2019-03-04
Attending: ORTHOPAEDIC SURGERY
Payer: COMMERCIAL

## 2019-03-04 PROCEDURE — 97140 MANUAL THERAPY 1/> REGIONS: CPT

## 2019-03-04 PROCEDURE — 97110 THERAPEUTIC EXERCISES: CPT

## 2019-03-04 NOTE — PROGRESS NOTES
Diagnosis:Z47.89 (ICD-10-CM) - V54.9 (ICD-9-CM) - Orthopedic aftercare  S42.295D (ICD-10-CM) - V54.11 (ICD-9-CM) - Other closed nondisplaced fracture of proximal end of left humerus with routine healing, subsequent encounter    Authorized # of Visits:  10 Goals:   By  5 wks unless otherwise stated.     · Pt will improve shoulder flexion PROM to > 145 degrees and AROM to 140 deg to be able to reach into overhead cabinets without pain or restriction  · Pt will improve shoulder abduction AROM to 120> degrees to

## 2019-03-06 ENCOUNTER — OFFICE VISIT (OUTPATIENT)
Dept: PHYSICAL THERAPY | Facility: HOSPITAL | Age: 84
End: 2019-03-06
Attending: ORTHOPAEDIC SURGERY
Payer: COMMERCIAL

## 2019-03-06 PROCEDURE — 97140 MANUAL THERAPY 1/> REGIONS: CPT

## 2019-03-06 PROCEDURE — 97110 THERAPEUTIC EXERCISES: CPT

## 2019-03-06 NOTE — PROGRESS NOTES
Diagnosis:Z47.89 (ICD-10-CM) - V54.9 (ICD-9-CM) - Orthopedic aftercare  S42.295D (ICD-10-CM) - V54.11 (ICD-9-CM) - Other closed nondisplaced fracture of proximal end of left humerus with routine healing, subsequent encounter    Authorized # of Visits:  10 [facility] jt mobs grade II inf and post glides   STM to left posterior cuff, subscapularis, triceps/biceps     Assessment: Progressed to scapular clock. Fair awareness of scapular position and mechanics. Goals:   By  5 wks unless otherwise stated.     · Pt maritza

## 2019-03-11 ENCOUNTER — OFFICE VISIT (OUTPATIENT)
Dept: PHYSICAL THERAPY | Facility: HOSPITAL | Age: 84
End: 2019-03-11
Attending: ORTHOPAEDIC SURGERY
Payer: COMMERCIAL

## 2019-03-11 PROCEDURE — 97110 THERAPEUTIC EXERCISES: CPT

## 2019-03-11 PROCEDURE — 97140 MANUAL THERAPY 1/> REGIONS: CPT

## 2019-03-11 NOTE — PROGRESS NOTES
Diagnosis:Z47.89 (ICD-10-CM) - V54.9 (ICD-9-CM) - Orthopedic aftercare  S42.295D (ICD-10-CM) - V54.11 (ICD-9-CM) - Other closed nondisplaced fracture of proximal end of left humerus with routine healing, subsequent encounter    Authorized # of Visits:  10 correction  Forearm towel slides on wall 2 x 10  Standing shoulder rolls and retractions-corrected and reviewed.   Ball pass around trunk CW/CCW x 10 each         Man Mob:   PROM L shoulder all planes  GH jt mobs grade II inf and post glides   STM to left p

## 2019-03-13 ENCOUNTER — OFFICE VISIT (OUTPATIENT)
Dept: PHYSICAL THERAPY | Facility: HOSPITAL | Age: 84
End: 2019-03-13
Attending: ORTHOPAEDIC SURGERY
Payer: COMMERCIAL

## 2019-03-13 PROCEDURE — 97110 THERAPEUTIC EXERCISES: CPT

## 2019-03-13 PROCEDURE — 97140 MANUAL THERAPY 1/> REGIONS: CPT

## 2019-03-13 NOTE — PROGRESS NOTES
Diagnosis:Z47.89 (ICD-10-CM) - V54.9 (ICD-9-CM) - Orthopedic aftercare  S42.295D (ICD-10-CM) - V54.11 (ICD-9-CM) - Other closed nondisplaced fracture of proximal end of left humerus with routine healing, subsequent encounter    Authorized # of Visits:  10 cuff wt. 10x 2  Prone horz abd 2 x 10  Prone sh ext to neutral 2 x 10  Supine serratus punch 2 x 10   Sidelying abduction: 10x 1 with 1#  wt.    Forearm towel slides on wall 2 x 10  Ball pass around trunk CW/CCW x 15 each         Man Mob:   PROM L shoulder

## 2019-03-14 ENCOUNTER — TELEPHONE (OUTPATIENT)
Dept: ORTHOPEDICS CLINIC | Facility: CLINIC | Age: 84
End: 2019-03-14

## 2019-03-14 DIAGNOSIS — S42.92XA CLOSED FRACTURE OF LEFT SHOULDER, INITIAL ENCOUNTER: Primary | ICD-10-CM

## 2019-03-18 ENCOUNTER — OFFICE VISIT (OUTPATIENT)
Dept: PHYSICAL THERAPY | Facility: HOSPITAL | Age: 84
End: 2019-03-18
Attending: ORTHOPAEDIC SURGERY
Payer: COMMERCIAL

## 2019-03-18 PROCEDURE — 97140 MANUAL THERAPY 1/> REGIONS: CPT

## 2019-03-18 PROCEDURE — 97110 THERAPEUTIC EXERCISES: CPT

## 2019-03-18 NOTE — PROGRESS NOTES
Diagnosis:Z47.89 (ICD-10-CM) - V54.9 (ICD-9-CM) - Orthopedic aftercare  S42.295D (ICD-10-CM) - V54.11 (ICD-9-CM) - Other closed nondisplaced fracture of proximal end of left humerus with routine healing, subsequent encounter    Authorized # of Visits:  10 10  Supine serratus punch 2 x 10   Sidelying abduction to 90 deg : 10x 2   Forearm towel slides on wall 2 x 10  Ball pass around trunk CW/CCW x 15 each   Ball roll up wall 2 x 10  Supine serratus punch 1# 2 x 10  Supine subscapularis with 1# x 10

## 2019-03-20 ENCOUNTER — OFFICE VISIT (OUTPATIENT)
Dept: PHYSICAL THERAPY | Facility: HOSPITAL | Age: 84
End: 2019-03-20
Attending: ORTHOPAEDIC SURGERY
Payer: COMMERCIAL

## 2019-03-20 PROCEDURE — 97140 MANUAL THERAPY 1/> REGIONS: CPT

## 2019-03-20 PROCEDURE — 97110 THERAPEUTIC EXERCISES: CPT

## 2019-03-20 NOTE — PROGRESS NOTES
Diagnosis:Z47.89 (ICD-10-CM) - V54.9 (ICD-9-CM) - Orthopedic aftercare  S42.295D (ICD-10-CM) - V54.11 (ICD-9-CM) - Other closed nondisplaced fracture of proximal end of left humerus with routine healing, subsequent encounter    Authorized # of Visits:  10 pocket, tuck in shirt, pull up pants, fasten bra without pain  MET  · Pt will improve shoulder strength throughout to 4+/5 to improve function with ADLs including lifting groceries opening and closing doors  Partially MET  · Pt will demonstrate increased m 15  Supine L shoulder flex 2 x 10  sidelying Horizontal add/abd (deceleration 0#)  Seated shoulder flexion with 1 # to 90 deg  Seated wand chest press 2 x 10  3# with \"plus\"  Seated B ER in W position 2 x 10 with 1#  Seated biceps curls 2# 2 x 10  Sidely

## 2019-03-21 ENCOUNTER — OFFICE VISIT (OUTPATIENT)
Dept: ORTHOPEDICS CLINIC | Facility: CLINIC | Age: 84
End: 2019-03-21

## 2019-03-21 ENCOUNTER — HOSPITAL ENCOUNTER (OUTPATIENT)
Dept: GENERAL RADIOLOGY | Facility: HOSPITAL | Age: 84
Discharge: HOME | End: 2019-03-21
Attending: ORTHOPAEDIC SURGERY
Payer: COMMERCIAL

## 2019-03-21 DIAGNOSIS — Z47.89 ORTHOPEDIC AFTERCARE: ICD-10-CM

## 2019-03-21 DIAGNOSIS — Z47.89 ORTHOPEDIC AFTERCARE: Primary | ICD-10-CM

## 2019-03-21 DIAGNOSIS — S42.295D OTHER CLOSED NONDISPLACED FRACTURE OF PROXIMAL END OF LEFT HUMERUS WITH ROUTINE HEALING, SUBSEQUENT ENCOUNTER: ICD-10-CM

## 2019-03-21 PROCEDURE — 99024 POSTOP FOLLOW-UP VISIT: CPT | Performed by: ORTHOPAEDIC SURGERY

## 2019-03-21 PROCEDURE — 99212 OFFICE O/P EST SF 10 MIN: CPT | Performed by: ORTHOPAEDIC SURGERY

## 2019-03-21 PROCEDURE — 73030 X-RAY EXAM OF SHOULDER: CPT | Performed by: ORTHOPAEDIC SURGERY

## 2019-03-21 NOTE — PROGRESS NOTES
3/21/2019  Mercer Sarmiento  5/30/1930  80year old   female  Job MD Darby    HPI:   Patient presents with:  Fracture: L shoulder f/u - states she is good - has some pain once in a while rated as 2-3/10       The patient complains of pain located left s may now advance activities as tolerated. She will advance therapy    All of their questions were answered and they are in agreement with the treatment plan.     The patient will return to clinic as needed    No orders of the defined types were placed in th

## 2019-03-27 ENCOUNTER — OFFICE VISIT (OUTPATIENT)
Dept: PHYSICAL THERAPY | Facility: HOSPITAL | Age: 84
End: 2019-03-27
Attending: INTERNAL MEDICINE
Payer: COMMERCIAL

## 2019-03-27 PROCEDURE — 97140 MANUAL THERAPY 1/> REGIONS: CPT

## 2019-03-27 PROCEDURE — 97110 THERAPEUTIC EXERCISES: CPT

## 2019-03-27 NOTE — PROGRESS NOTES
Diagnosis:Z47.89 (ICD-10-CM) - V54.9 (ICD-9-CM) - Orthopedic aftercare  S42.295D (ICD-10-CM) - V54.11 (ICD-9-CM) - Other closed nondisplaced fracture of proximal end of left humerus with routine healing, subsequent encounter    Authorized # of Visits:  10 initial, strength gains. Good tolerance to program. Still scap sub  Goals:   By  5 wks unless otherwise stated.     · Pt will improve shoulder flexion PROM to > 145 degrees and AROM to 140 deg to be able to reach into overhead cabinets without pain or rest

## 2019-04-02 ENCOUNTER — OFFICE VISIT (OUTPATIENT)
Dept: PHYSICAL THERAPY | Facility: HOSPITAL | Age: 84
End: 2019-04-02
Attending: INTERNAL MEDICINE
Payer: COMMERCIAL

## 2019-04-02 PROCEDURE — 97140 MANUAL THERAPY 1/> REGIONS: CPT

## 2019-04-02 PROCEDURE — 97110 THERAPEUTIC EXERCISES: CPT

## 2019-04-02 RX ORDER — AMLODIPINE BESYLATE 10 MG/1
TABLET ORAL
Qty: 90 TABLET | Refills: 0 | Status: SHIPPED | OUTPATIENT
Start: 2019-04-02 | End: 2019-04-23

## 2019-04-02 NOTE — PROGRESS NOTES
Diagnosis:Z47.89 (ICD-10-CM) - V54.9 (ICD-9-CM) - Orthopedic aftercare  S42.295D (ICD-10-CM) - V54.11 (ICD-9-CM) - Other closed nondisplaced fracture of proximal end of left humerus with routine healing, subsequent encounter    Authorized # of Visits:  10 10x 2  1#    Man Mob:   PROM L shoulder all planes  GH jt mobs grade II inf and post glides   STM to left posterior cuff, subscapularis, triceps/biceps, pect minor   Assisted pect minor stretches.     Assessment: Patient with good gains in AROM from initial

## 2019-04-03 ENCOUNTER — TELEPHONE (OUTPATIENT)
Dept: HEMATOLOGY/ONCOLOGY | Facility: HOSPITAL | Age: 84
End: 2019-04-03

## 2019-04-03 NOTE — TELEPHONE ENCOUNTER
Refill passed per [facility], [facility] protocol.     Hypertensive Medications  Protocol Criteria:  · Appointment scheduled in the past 6 months or in the next 3 months  · BMP or CMP in the past 12 months  · Creatinine result < 2  Recent Outpatient Visits 11/21/2018    GLOBULIN 2.9 11/21/2018    AGRATIO 1.6 04/25/2016    ANIONGAP 8 11/21/2018    [facility] 292 11/21/2018

## 2019-04-03 NOTE — TELEPHONE ENCOUNTER
Central scheduling called because the patient is trying to schedule a mammogram, They need the order in the system.  The patient will see  on 4/19/19 and she is due for her next imaging in July, Please add orders

## 2019-04-13 RX ORDER — METOPROLOL TARTRATE 50 MG/1
TABLET, FILM COATED ORAL
Qty: 180 TABLET | Refills: 1 | Status: SHIPPED | OUTPATIENT
Start: 2019-04-13 | End: 2019-04-23

## 2019-04-13 RX ORDER — LOVASTATIN 40 MG/1
TABLET ORAL
Qty: 90 TABLET | Refills: 1 | Status: SHIPPED | OUTPATIENT
Start: 2019-04-13 | End: 2019-09-17

## 2019-04-13 NOTE — TELEPHONE ENCOUNTER
Refill passed per [facility], [facility] protocol.     Cholesterol Medications  Protocol Criteria:  · Appointment scheduled in the past 12 months or in the next 3 months  · ALT & LDL on file in the past 12 months  · ALT result < 80  · LDL result <130   Recent Outp Component Value Date     (H) 11/21/2018     Lab Results   Component Value Date    ALT 21 11/21/2018       Hypertensive Medications  Protocol Criteria:  · Appointment scheduled in the past 6 months or in the next 3 months  · BMP or CMP in the past Dermatology yearly body check        Lab Results   Component Value Date     (H) 11/21/2018    BUN 24 (H) 11/21/2018    CREATSERUM 0.83 11/21/2018    BUNCREA 28.9 (H) 11/21/2018    GFRNAA >60 11/21/2018    GFRAA >60 11/21/2018    CA 9.6 11/21/2018

## 2019-04-16 NOTE — TELEPHONE ENCOUNTER
Please call pt, needs to repeat lab prior to refill as dose may need adjusting.   See  Last lab note

## 2019-04-17 ENCOUNTER — OFFICE VISIT (OUTPATIENT)
Dept: PHYSICAL THERAPY | Facility: HOSPITAL | Age: 84
End: 2019-04-17
Attending: ORTHOPAEDIC SURGERY
Payer: COMMERCIAL

## 2019-04-17 ENCOUNTER — TELEPHONE (OUTPATIENT)
Dept: INTERNAL MEDICINE CLINIC | Facility: CLINIC | Age: 84
End: 2019-04-17

## 2019-04-17 DIAGNOSIS — C50.811 MALIGNANT NEOPLASM OF OVERLAPPING SITES OF RIGHT BREAST IN FEMALE, ESTROGEN RECEPTOR POSITIVE: Primary | ICD-10-CM

## 2019-04-17 DIAGNOSIS — Z17.0 MALIGNANT NEOPLASM OF OVERLAPPING SITES OF RIGHT BREAST IN FEMALE, ESTROGEN RECEPTOR POSITIVE: Primary | ICD-10-CM

## 2019-04-17 PROCEDURE — 97140 MANUAL THERAPY 1/> REGIONS: CPT

## 2019-04-17 PROCEDURE — 97110 THERAPEUTIC EXERCISES: CPT

## 2019-04-17 RX ORDER — LEVOTHYROXINE SODIUM 112 UG/1
TABLET ORAL
Qty: 90 TABLET | Refills: 0 | OUTPATIENT
Start: 2019-04-17

## 2019-04-17 NOTE — TELEPHONE ENCOUNTER
Deepa Vogel from [facility]- requesting referral for patient to see Dr. Fern Andrews.     Please sign off on this referral.    Thanks,    Jerson

## 2019-04-19 ENCOUNTER — OFFICE VISIT (OUTPATIENT)
Dept: HEMATOLOGY/ONCOLOGY | Facility: HOSPITAL | Age: 84
End: 2019-04-19
Attending: INTERNAL MEDICINE
Payer: COMMERCIAL

## 2019-04-19 ENCOUNTER — APPOINTMENT (OUTPATIENT)
Dept: LAB | Facility: HOSPITAL | Age: 84
End: 2019-04-19
Attending: INTERNAL MEDICINE
Payer: COMMERCIAL

## 2019-04-19 VITALS
HEART RATE: 68 BPM | OXYGEN SATURATION: 92 % | RESPIRATION RATE: 16 BRPM | TEMPERATURE: 99 F | SYSTOLIC BLOOD PRESSURE: 156 MMHG | HEIGHT: 63 IN | DIASTOLIC BLOOD PRESSURE: 64 MMHG | WEIGHT: 127 LBS | BODY MASS INDEX: 22.5 KG/M2

## 2019-04-19 DIAGNOSIS — C50.811 MALIGNANT NEOPLASM OF OVERLAPPING SITES OF RIGHT BREAST IN FEMALE, ESTROGEN RECEPTOR POSITIVE (HCC): Primary | ICD-10-CM

## 2019-04-19 DIAGNOSIS — C56.9 MALIGNANT NEOPLASM OF OVARY, UNSPECIFIED LATERALITY (HCC): ICD-10-CM

## 2019-04-19 DIAGNOSIS — M85.80 OSTEOPENIA DETERMINED BY X-RAY: ICD-10-CM

## 2019-04-19 DIAGNOSIS — T45.1X5A PERIPHERAL NEUROPATHY DUE TO CHEMOTHERAPY (HCC): ICD-10-CM

## 2019-04-19 DIAGNOSIS — Z17.0 MALIGNANT NEOPLASM OF OVERLAPPING SITES OF RIGHT BREAST IN FEMALE, ESTROGEN RECEPTOR POSITIVE (HCC): Primary | ICD-10-CM

## 2019-04-19 DIAGNOSIS — E03.9 ACQUIRED HYPOTHYROIDISM: ICD-10-CM

## 2019-04-19 DIAGNOSIS — S42.295D OTHER CLOSED NONDISPLACED FRACTURE OF PROXIMAL END OF LEFT HUMERUS WITH ROUTINE HEALING, SUBSEQUENT ENCOUNTER: ICD-10-CM

## 2019-04-19 DIAGNOSIS — G62.0 PERIPHERAL NEUROPATHY DUE TO CHEMOTHERAPY (HCC): ICD-10-CM

## 2019-04-19 DIAGNOSIS — Z79.811 ENCOUNTER FOR MONITORING AROMATASE INHIBITOR THERAPY: ICD-10-CM

## 2019-04-19 DIAGNOSIS — Z51.81 ENCOUNTER FOR MONITORING AROMATASE INHIBITOR THERAPY: ICD-10-CM

## 2019-04-19 PROCEDURE — 84443 ASSAY THYROID STIM HORMONE: CPT

## 2019-04-19 PROCEDURE — 36415 COLL VENOUS BLD VENIPUNCTURE: CPT

## 2019-04-19 PROCEDURE — 99211 OFF/OP EST MAY X REQ PHY/QHP: CPT

## 2019-04-19 NOTE — PATIENT INSTRUCTIONS
Continue anastrozole 1 mg daily     Arrange for repeat bone density - you could do this study now     Follow-up mammogram left breast in July 2019     Increase weight bearing exercise     Advise addition of vitamin D level with next blood draw.

## 2019-04-20 NOTE — PROGRESS NOTES
ANA MARIA Rosado is a 80year old female who presents for follow-up of her right hormone receptor positive, HER-2/yusuf negative, pT1b N1(mi) M0 breast cancer. She underwent mastectomy 4/27/16.        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   ER 98%, AK 98%, HER-2/yusuf -, Ki-67 4% Skin: Negative for rash. 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    Psychiatric/Behavioral: Negative fo • Peripheral neuropathy due to chemotherapy [facility])    • Pregnancy      x 3   • Rotator cuff injury     right   • Vitreous floaters 2011    OD     Past Surgical History:   Procedure Laterality Date   • BILAT SALPINGO-OOPHORECT W/ OMENTECT, TOTAL ABDOM HY Relationship status: Not on file      Intimate partner violence:        Fear of current or ex partner: Not on file        Emotionally abused: Not on file        Physically abused: Not on file        Forced sexual activity: Not on file    Other Topics Constitutional: She is oriented to person, place, and time. She appears well-developed and well-nourished. HENT:   Head: Normocephalic and atraumatic. Eyes: Pupils are equal, round, and reactive to light.  Conjunctivae and EOM are normal. No scleral ict The patient has hormone receptor positive pT1b(m) N1mi M0 right breast cancer and is continuing hormonal therapy with anastrozole 1 mg daily.   Patient last had a mammogram the left breast 4/5/2017 as an order for a repeat left screening mammogram.      The 5.9 - 8.4 g/dL 7.4   Albumin      3.5 - 4.8 g/dL 4.3   GLOBULIN, TOTAL      2.5 - 3.7 g/dL 3.1   A/G RATIO      1.0 - 2.0 1.4   ANION GAP      0 - 18 mmol/L 7   BUN/CREA RATIO      10.0 - 20.0 21.9 (H)   CALCULATED OSMOLALITY      275 - 295 mOsm/kg 296 0.45 - 5.33 uIU/mL 0.03 (L)     Final Diagnosis:      Right upper eyelid lesion:  · Basal cell carcinoma, nodular type. · Carcinoma is present at less than 0.5 mm from the deep tissue edge.    Electronically signed by Riya Rush MD on 11/15/ Left breast: Distribution the parenchyma is stable with diffuse vascular calcifications and some benign calcifications. Axillary region is unremarkable.                         Dictated by (CST): Bao Jacques M.D. on 4/05/2017 at 9:38       Approved      Low bone mass (T score between -1.0 and -2.5 at the femoral neck or     spine) and a 10-year probability of a hip fracture > 3% or a 10-year  probability of a major osteoporosis-related fracture > 20% based on the  [place]-adapted WHO algorithm

## 2019-04-22 ENCOUNTER — OFFICE VISIT (OUTPATIENT)
Dept: PHYSICAL THERAPY | Facility: HOSPITAL | Age: 84
End: 2019-04-22
Attending: ORTHOPAEDIC SURGERY
Payer: COMMERCIAL

## 2019-04-22 DIAGNOSIS — R79.89 LOW THYROID STIMULATING HORMONE (TSH) LEVEL: Primary | ICD-10-CM

## 2019-04-22 PROCEDURE — 97110 THERAPEUTIC EXERCISES: CPT

## 2019-04-22 RX ORDER — LEVOTHYROXINE SODIUM 100 UG/1
100 CAPSULE ORAL DAILY
Qty: 90 CAPSULE | Refills: 0 | Status: SHIPPED | OUTPATIENT
Start: 2019-04-22 | End: 2019-04-23

## 2019-04-23 ENCOUNTER — OFFICE VISIT (OUTPATIENT)
Dept: INTERNAL MEDICINE CLINIC | Facility: CLINIC | Age: 84
End: 2019-04-23

## 2019-04-23 VITALS
RESPIRATION RATE: 16 BRPM | WEIGHT: 128 LBS | SYSTOLIC BLOOD PRESSURE: 132 MMHG | BODY MASS INDEX: 25.13 KG/M2 | HEART RATE: 63 BPM | DIASTOLIC BLOOD PRESSURE: 76 MMHG | HEIGHT: 60 IN

## 2019-04-23 DIAGNOSIS — Z78.0 OSTEOPENIA AFTER MENOPAUSE: Primary | ICD-10-CM

## 2019-04-23 DIAGNOSIS — M85.80 OSTEOPENIA AFTER MENOPAUSE: Primary | ICD-10-CM

## 2019-04-23 PROCEDURE — 99212 OFFICE O/P EST SF 10 MIN: CPT | Performed by: INTERNAL MEDICINE

## 2019-04-23 PROCEDURE — 99214 OFFICE O/P EST MOD 30 MIN: CPT | Performed by: INTERNAL MEDICINE

## 2019-04-23 RX ORDER — LEVOTHYROXINE SODIUM 88 UG/1
88 TABLET ORAL
Qty: 90 TABLET | Refills: 3 | Status: SHIPPED | OUTPATIENT
Start: 2019-04-23 | End: 2019-07-23

## 2019-04-23 NOTE — PROGRESS NOTES
Leah Jo is a 80year old female. HPI:     1. Essential hypertension with goal blood pressure less than 140/90    Patient has been following low salt diet and has been taking anti-hypertensive prescriptions as prescribed.  Blood pressure has been check edge   • Dyslipidemia    • Epiretinal membrane (ERM) of right eye    • Hypertension    • Hypothyroidism 1990   • Malignant neoplasm of overlapping sites of right breast in female, estrogen receptor positive ([facility] [address] 3/17/2016   • Mass 2013    excision mass LR BP running a little high. Check BP at home and follow up if >140/90    2. Mixed Hyperlipidemia     Patient instructed to take cholesterol lowering medications as prescribed and to continue to follow a low fat, low cholesterol diet as discussed.  Try to exer

## 2019-04-24 ENCOUNTER — TELEPHONE (OUTPATIENT)
Dept: INTERNAL MEDICINE CLINIC | Facility: CLINIC | Age: 84
End: 2019-04-24

## 2019-04-24 ENCOUNTER — OFFICE VISIT (OUTPATIENT)
Dept: PHYSICAL THERAPY | Facility: HOSPITAL | Age: 84
End: 2019-04-24
Attending: ORTHOPAEDIC SURGERY
Payer: COMMERCIAL

## 2019-04-24 PROCEDURE — 97110 THERAPEUTIC EXERCISES: CPT

## 2019-04-24 PROCEDURE — 97140 MANUAL THERAPY 1/> REGIONS: CPT

## 2019-04-24 NOTE — PROGRESS NOTES
Diagnosis:Z47.89 (ICD-10-CM) - V54.9 (ICD-9-CM) - Orthopedic aftercare  S42.295D (ICD-10-CM) - V54.11 (ICD-9-CM) - Other closed nondisplaced fracture of proximal end of left humerus with routine healing, subsequent encounter    Authorized # of Visits:  10 stretches. Assessment: Patient with no pain this session. Pt has good strength except for scapular stabilizer muscles. Goals:   By  5 wks unless otherwise stated.     · Pt will improve shoulder flexion PROM to > 145 degrees and AROM to 140 deg to be

## 2019-04-24 NOTE — TELEPHONE ENCOUNTER
Pt states that Dr. Domenic Pang prescribed tirosint/ thyroid medication and she received a call from the pharmacy stating that her cost of the medication is $180 dollars for her co pay.  Per pt this is too expensive and would like to confirm that the doctor did

## 2019-04-24 NOTE — TELEPHONE ENCOUNTER
Dr.Van Barrett Please see pt message below.   I called pt mail pharmacy and was informed that on 4/22 you had send them a script for Levothyroxine Sodium 100 MCG Oral Cap 100 mcg capsule and if you want capsule it can only be given brand name so this will be

## 2019-04-30 ENCOUNTER — APPOINTMENT (OUTPATIENT)
Dept: PHYSICAL THERAPY | Facility: HOSPITAL | Age: 84
End: 2019-04-30
Attending: ORTHOPAEDIC SURGERY
Payer: COMMERCIAL

## 2019-04-30 PROCEDURE — 97140 MANUAL THERAPY 1/> REGIONS: CPT

## 2019-04-30 PROCEDURE — 97110 THERAPEUTIC EXERCISES: CPT

## 2019-04-30 NOTE — PROGRESS NOTES
Diagnosis:Z47.89 (ICD-10-CM) - V54.9 (ICD-9-CM) - Orthopedic aftercare  S42.295D (ICD-10-CM) - V54.11 (ICD-9-CM) - Other closed nondisplaced fracture of proximal end of left humerus with routine healing, subsequent encounter    Authorized # of Visits:  10 in W position 2 x 10 with RTB  S/L ER with towel in axilla 1# 2 x 15  Supine serratus at 100 deg flex 1# x 10   Supine YTB subscapularis to 90 deg x 10  GTB narrow rows 2 x 15      Assessment: Patient with improved ROM for ext of shoulder post treatment. Alyssa Ambriz

## 2019-05-02 ENCOUNTER — OFFICE VISIT (OUTPATIENT)
Dept: PHYSICAL THERAPY | Facility: HOSPITAL | Age: 84
End: 2019-05-02
Attending: ORTHOPAEDIC SURGERY
Payer: COMMERCIAL

## 2019-05-02 ENCOUNTER — TELEPHONE (OUTPATIENT)
Dept: PHYSICAL THERAPY | Age: 84
End: 2019-05-02

## 2019-05-02 PROCEDURE — 97110 THERAPEUTIC EXERCISES: CPT

## 2019-05-02 PROCEDURE — 97140 MANUAL THERAPY 1/> REGIONS: CPT

## 2019-05-02 NOTE — PROGRESS NOTES
Diagnosis:Z47.89 (ICD-10-CM) - V54.9 (ICD-9-CM) - Orthopedic aftercare  S42.295D (ICD-10-CM) - V54.11 (ICD-9-CM) - Other closed nondisplaced fracture of proximal end of left humerus with routine healing, subsequent encounter    Authorized # of Visits:  10 10  Supine sh flex 1#  1 x 15   PROM: all major left shoulder planes  Standing shoulder AROM with cues for avoiding scapular elevation 1 x 10  Seated B ER in W position 2 x 10 with RTB  S/L ER with towel in axilla 1# 2 x 15  Supine serratus at 100 deg flex

## 2019-05-07 ENCOUNTER — OFFICE VISIT (OUTPATIENT)
Dept: PHYSICAL THERAPY | Facility: HOSPITAL | Age: 84
End: 2019-05-07
Attending: ORTHOPAEDIC SURGERY
Payer: COMMERCIAL

## 2019-05-07 PROCEDURE — 97140 MANUAL THERAPY 1/> REGIONS: CPT

## 2019-05-07 PROCEDURE — 97110 THERAPEUTIC EXERCISES: CPT

## 2019-05-07 NOTE — PROGRESS NOTES
Diagnosis:Z47.89 (ICD-10-CM) - V54.9 (ICD-9-CM) - Orthopedic aftercare  S42.295D (ICD-10-CM) - V54.11 (ICD-9-CM) - Other closed nondisplaced fracture of proximal end of left humerus with routine healing, subsequent encounter    Authorized # of Visits:  10 10  SPROM: all major left shoulder planes  Standing shoulder AROM with cues for avoiding scapular elevation 1 x 10  Seated B ER in W position 2 x 10 with RTB  Supine serratus at 100 deg flex 1# x 10   Supine YTB subscapularis to 90 deg x 10  GTB narrow row

## 2019-05-09 ENCOUNTER — OFFICE VISIT (OUTPATIENT)
Dept: PHYSICAL THERAPY | Facility: HOSPITAL | Age: 84
End: 2019-05-09
Attending: ORTHOPAEDIC SURGERY
Payer: COMMERCIAL

## 2019-05-09 PROCEDURE — 97140 MANUAL THERAPY 1/> REGIONS: CPT

## 2019-05-09 PROCEDURE — 97110 THERAPEUTIC EXERCISES: CPT

## 2019-05-09 NOTE — PROGRESS NOTES
Diagnosis:Z47.89 (ICD-10-CM) - V54.9 (ICD-9-CM) - Orthopedic aftercare  S42.295D (ICD-10-CM) - V54.11 (ICD-9-CM) - Other closed nondisplaced fracture of proximal end of left humerus with routine healing, subsequent encounter    Authorized # of Visits:  10 Standing shoulder AROM with cues for avoiding scapular elevation 1 x 10  Seated B ER in W position 2 x 10 with RTB  Supine serratus at 100 deg flex 1# x 10   Supine YTB subscapularis to 90 deg x 10  GTB narrow rows 2 x 15Prone horz abd x 15      Assessment

## 2019-05-15 ENCOUNTER — OFFICE VISIT (OUTPATIENT)
Dept: PHYSICAL THERAPY | Facility: HOSPITAL | Age: 84
End: 2019-05-15
Attending: ORTHOPAEDIC SURGERY
Payer: COMMERCIAL

## 2019-05-15 PROCEDURE — 97110 THERAPEUTIC EXERCISES: CPT

## 2019-05-15 NOTE — PROGRESS NOTES
Discharge Summary    Pt has attended 18, cancelled 0, and no shown 0 visits in Physical Therapy.      Progress Note Start Date: 2/20/2019 End Date: 5/15/2019    Diagnosis:Z47.89 (ICD-10-CM) - V54.9 (ICD-9-CM) - Orthopedic aftercare  S42.295D (ICD-10-CM) - V · Pt will increase shoulder AROM IR to 70 deg to be able to reach in back pocket, tuck in shirt, pull up pants, fasten bra without pain  MET  · Pt will improve shoulder strength throughout to 4+/5 to improve function with ADLs including lifting groceries o

## 2019-06-10 DIAGNOSIS — Z79.811 ENCOUNTER FOR MONITORING AROMATASE INHIBITOR THERAPY: ICD-10-CM

## 2019-06-10 DIAGNOSIS — C50.811 MALIGNANT NEOPLASM OF OVERLAPPING SITES OF RIGHT BREAST IN FEMALE, ESTROGEN RECEPTOR POSITIVE: ICD-10-CM

## 2019-06-10 DIAGNOSIS — Z17.0 MALIGNANT NEOPLASM OF OVERLAPPING SITES OF RIGHT BREAST IN FEMALE, ESTROGEN RECEPTOR POSITIVE: ICD-10-CM

## 2019-06-10 DIAGNOSIS — Z51.81 ENCOUNTER FOR MONITORING AROMATASE INHIBITOR THERAPY: ICD-10-CM

## 2019-06-10 RX ORDER — ANASTROZOLE 1 MG/1
TABLET ORAL
Qty: 90 TABLET | Refills: 3 | Status: SHIPPED | OUTPATIENT
Start: 2019-06-10 | End: 2020-04-15

## 2019-06-21 RX ORDER — AMLODIPINE BESYLATE 10 MG/1
TABLET ORAL
Qty: 90 TABLET | Refills: 1 | Status: SHIPPED | OUTPATIENT
Start: 2019-06-21 | End: 2019-07-23

## 2019-06-21 NOTE — TELEPHONE ENCOUNTER
Refill passed per [facility] protocol.   Hypertensive Medications  Protocol Criteria:  · Appointment scheduled in the past 6 months or in the next 3 months  · BMP or CMP in the past 12 months  · Creatinine result < 2  Recent Outpatient Visits

## 2019-07-12 ENCOUNTER — TELEPHONE (OUTPATIENT)
Dept: HEMATOLOGY/ONCOLOGY | Facility: HOSPITAL | Age: 84
End: 2019-07-12

## 2019-07-12 DIAGNOSIS — M81.0 OSTEOPOROSIS, SENILE: ICD-10-CM

## 2019-07-12 DIAGNOSIS — Z12.31 SCREENING MAMMOGRAM, ENCOUNTER FOR: ICD-10-CM

## 2019-07-12 DIAGNOSIS — C50.919 BREAST CANCER (HCC): ICD-10-CM

## 2019-07-12 DIAGNOSIS — M85.80 OSTEOPENIA DETERMINED BY X-RAY: Primary | ICD-10-CM

## 2019-07-12 NOTE — TELEPHONE ENCOUNTER
We received a call saying that the diagnosis for the dexa and mammo were not passing medicare. They were looking to get a new diagnosis for both.  Please advise

## 2019-07-22 ENCOUNTER — APPOINTMENT (OUTPATIENT)
Dept: LAB | Facility: HOSPITAL | Age: 84
End: 2019-07-22
Attending: INTERNAL MEDICINE
Payer: COMMERCIAL

## 2019-07-22 DIAGNOSIS — M85.80 OSTEOPENIA AFTER MENOPAUSE: ICD-10-CM

## 2019-07-22 DIAGNOSIS — Z78.0 OSTEOPENIA AFTER MENOPAUSE: ICD-10-CM

## 2019-07-22 DIAGNOSIS — R79.89 LOW THYROID STIMULATING HORMONE (TSH) LEVEL: ICD-10-CM

## 2019-07-22 LAB
25(OH)D3 SERPL-MCNC: 64 NG/ML (ref 30–100)
TSI SER-ACNC: 0.47 MIU/ML (ref 0.36–3.74)

## 2019-07-22 PROCEDURE — 36415 COLL VENOUS BLD VENIPUNCTURE: CPT

## 2019-07-22 PROCEDURE — 82306 VITAMIN D 25 HYDROXY: CPT

## 2019-07-22 PROCEDURE — 84443 ASSAY THYROID STIM HORMONE: CPT

## 2019-07-23 ENCOUNTER — OFFICE VISIT (OUTPATIENT)
Dept: INTERNAL MEDICINE CLINIC | Facility: CLINIC | Age: 84
End: 2019-07-23

## 2019-07-23 VITALS
DIASTOLIC BLOOD PRESSURE: 66 MMHG | BODY MASS INDEX: 22.86 KG/M2 | HEART RATE: 57 BPM | SYSTOLIC BLOOD PRESSURE: 126 MMHG | RESPIRATION RATE: 16 BRPM | HEIGHT: 63 IN | WEIGHT: 129 LBS

## 2019-07-23 DIAGNOSIS — E03.9 ACQUIRED HYPOTHYROIDISM: ICD-10-CM

## 2019-07-23 DIAGNOSIS — H25.091 AGE-RELATED INCIPIENT CATARACT OF RIGHT EYE: ICD-10-CM

## 2019-07-23 DIAGNOSIS — E78.2 MIXED HYPERLIPIDEMIA: ICD-10-CM

## 2019-07-23 DIAGNOSIS — I10 ESSENTIAL HYPERTENSION WITH GOAL BLOOD PRESSURE LESS THAN 140/90: Primary | ICD-10-CM

## 2019-07-23 PROCEDURE — 99214 OFFICE O/P EST MOD 30 MIN: CPT | Performed by: INTERNAL MEDICINE

## 2019-07-23 RX ORDER — OXYBUTYNIN CHLORIDE 5 MG/1
5 TABLET, EXTENDED RELEASE ORAL DAILY
Qty: 30 TABLET | Refills: 1 | Status: SHIPPED | OUTPATIENT
Start: 2019-07-23 | End: 2021-01-12

## 2019-07-23 RX ORDER — LEVOTHYROXINE SODIUM 88 UG/1
88 TABLET ORAL
Qty: 90 TABLET | Refills: 3 | Status: SHIPPED | OUTPATIENT
Start: 2019-07-23 | End: 2020-09-02

## 2019-07-23 NOTE — PROGRESS NOTES
Unique Palma is a 80year old female. HPI:     1. Essential hypertension with goal blood pressure less than 140/90    Patient has been following low salt diet and has been taking anti-hypertensive prescriptions as prescribed.  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 Nathaniel Ramires - Basal cell carcinoma- nodular type, carcinoma is present at less than 0.5 mm from the deep left upper arm. ASSESSMENT AND PLAN:     1. Essential hypertension  with goal blood pressure less than 140/90    Patient instructed to take anti-hypertensive medicines exactly as prescribed and to follow a low salt diet as discussed.  Regular exercise at

## 2019-08-16 ENCOUNTER — TELEPHONE (OUTPATIENT)
Dept: INTERNAL MEDICINE CLINIC | Facility: CLINIC | Age: 84
End: 2019-08-16

## 2019-08-16 DIAGNOSIS — Z85.828 PERSONAL HISTORY OF SKIN CANCER: Primary | ICD-10-CM

## 2019-08-16 NOTE — TELEPHONE ENCOUNTER
Pt has an appt for yearly skin cancer screening for Dermatology Dr. Mayra Antony on Monday 8/19/19.  Please advise on a referral.

## 2019-08-19 ENCOUNTER — OFFICE VISIT (OUTPATIENT)
Dept: DERMATOLOGY CLINIC | Facility: CLINIC | Age: 84
End: 2019-08-19

## 2019-08-19 DIAGNOSIS — D23.30 BENIGN NEOPLASM OF SKIN OF FACE: ICD-10-CM

## 2019-08-19 DIAGNOSIS — L82.1 SEBORRHEIC KERATOSES: ICD-10-CM

## 2019-08-19 DIAGNOSIS — D23.5 BENIGN NEOPLASM OF SKIN OF TRUNK, EXCEPT SCROTUM: ICD-10-CM

## 2019-08-19 DIAGNOSIS — D23.4 BENIGN NEOPLASM OF SCALP AND SKIN OF NECK: ICD-10-CM

## 2019-08-19 DIAGNOSIS — L57.0 ACTINIC KERATOSIS: Primary | ICD-10-CM

## 2019-08-19 DIAGNOSIS — L81.4 SOLAR LENTIGO: ICD-10-CM

## 2019-08-19 DIAGNOSIS — D48.5 NEOPLASM OF UNCERTAIN BEHAVIOR OF SKIN: ICD-10-CM

## 2019-08-19 DIAGNOSIS — D23.60 BENIGN NEOPLASM OF SKIN OF UPPER EXTREMITY AND SHOULDER, UNSPECIFIED LATERALITY: ICD-10-CM

## 2019-08-19 DIAGNOSIS — Z85.828 PERSONAL HISTORY OF SKIN CANCER: ICD-10-CM

## 2019-08-19 DIAGNOSIS — D23.70 BENIGN NEOPLASM OF SKIN OF LOWER EXTREMITY, INCLUDING HIP, UNSPECIFIED LATERALITY: ICD-10-CM

## 2019-08-19 DIAGNOSIS — L57.8 SUN-DAMAGED SKIN: ICD-10-CM

## 2019-08-19 PROCEDURE — 88305 TISSUE EXAM BY PATHOLOGIST: CPT | Performed by: DERMATOLOGY

## 2019-08-19 PROCEDURE — 11102 TANGNTL BX SKIN SINGLE LES: CPT | Performed by: DERMATOLOGY

## 2019-08-19 PROCEDURE — 99213 OFFICE O/P EST LOW 20 MIN: CPT | Performed by: DERMATOLOGY

## 2019-08-19 PROCEDURE — 17000 DESTRUCT PREMALG LESION: CPT | Performed by: DERMATOLOGY

## 2019-08-19 NOTE — PROGRESS NOTES
HPI:     Chief Complaint     Full Skin Exam; Lesion; Derm Problem        HPI     Full Skin Exam      Additional comments: LOV 8/20/2018. Pt presenting for full body skin exam. Pt has a personal hx of AKs and BCC to R eyelid (2017).                Lesion BY MOUTH DAILY Disp: 90 tablet Rfl: 3   LOVASTATIN 40 MG Oral Tab TAKE 1 TABLET BY MOUTH NIGHTLY Disp: 90 tablet Rfl: 1   AMLODIPINE BESYLATE 10 MG Oral Tab TAKE 1 TABLET BY MOUTH DAILY Disp: 90 tablet Rfl: 0   Multiple Vitamin (ONE-DAILY MULTI VITAMINS) O History    Socioeconomic History      Marital status:       Spouse name: Not on file      Number of children: 3      Years of education: Not on file      Highest education level: Not on file    Occupational History      Occupation: [employer] Grew up on a farm: Not Asked        History of tanning: Not Asked        Outdoor occupation: Not Asked        Pt has a pacemaker: No        Pt has a defibrillator: No        Breast feeding: Not Asked        Reaction to local anesthetic: No    Social Histor (primary encounter diagnosis)-lesion nasal bridge and presumed lesion around forehead treated with cryotherapy. Post cryo directions given. In light of these would like to recheck face in 3 months.   Neoplasm of uncertain behavior of skin rule out basal c

## 2019-08-23 NOTE — PROGRESS NOTES
Patient informed with verbal understanding. Booked appt for 6 weeks.  LOgged and in [facility]

## 2019-08-27 ENCOUNTER — HOSPITAL ENCOUNTER (OUTPATIENT)
Dept: MAMMOGRAPHY | Facility: HOSPITAL | Age: 84
Discharge: HOME | End: 2019-08-27
Attending: INTERNAL MEDICINE
Payer: COMMERCIAL

## 2019-08-27 ENCOUNTER — HOSPITAL ENCOUNTER (OUTPATIENT)
Dept: BONE DENSITY | Facility: HOSPITAL | Age: 84
Discharge: HOME | End: 2019-08-27
Attending: INTERNAL MEDICINE
Payer: COMMERCIAL

## 2019-08-27 DIAGNOSIS — M81.0 OSTEOPOROSIS, SENILE: ICD-10-CM

## 2019-08-27 DIAGNOSIS — M85.80 OSTEOPENIA DETERMINED BY X-RAY: ICD-10-CM

## 2019-08-27 DIAGNOSIS — C50.919 BREAST CANCER (HCC): ICD-10-CM

## 2019-08-27 DIAGNOSIS — Z12.31 SCREENING MAMMOGRAM, ENCOUNTER FOR: ICD-10-CM

## 2019-08-27 PROCEDURE — 77063 BREAST TOMOSYNTHESIS BI: CPT | Performed by: INTERNAL MEDICINE

## 2019-08-27 PROCEDURE — 77067 SCR MAMMO BI INCL CAD: CPT | Performed by: INTERNAL MEDICINE

## 2019-08-27 PROCEDURE — 77080 DXA BONE DENSITY AXIAL: CPT | Performed by: INTERNAL MEDICINE

## 2019-09-05 ENCOUNTER — MED REC SCAN ONLY (OUTPATIENT)
Dept: INTERNAL MEDICINE CLINIC | Facility: CLINIC | Age: 84
End: 2019-09-05

## 2019-09-18 RX ORDER — LOVASTATIN 40 MG/1
TABLET ORAL
Qty: 90 TABLET | Refills: 1 | Status: SHIPPED | OUTPATIENT
Start: 2019-09-18 | End: 2020-06-25

## 2019-09-19 NOTE — TELEPHONE ENCOUNTER
Please follow up and find out if still taking, metoprolol listed as discontinued    Cholesterol Medications  Protocol Criteria:  · Appointment scheduled in the past 12 months or in the next 3 months  · ALT & LDL on file in the past 12 months  · ALT result

## 2019-09-20 RX ORDER — METOPROLOL TARTRATE 50 MG/1
TABLET, FILM COATED ORAL
Qty: 180 TABLET | Refills: 1 | Status: SHIPPED | OUTPATIENT
Start: 2019-09-20 | End: 2020-09-09

## 2019-09-20 NOTE — TELEPHONE ENCOUNTER
Patient states she is still taking the metoprolol tartrate 50 mg tab BID. Please advise, order pending.

## 2019-10-30 ENCOUNTER — OFFICE VISIT (OUTPATIENT)
Dept: INTERNAL MEDICINE CLINIC | Facility: CLINIC | Age: 84
End: 2019-10-30

## 2019-10-30 VITALS
BODY MASS INDEX: 24.35 KG/M2 | HEIGHT: 61 IN | HEART RATE: 81 BPM | SYSTOLIC BLOOD PRESSURE: 132 MMHG | WEIGHT: 129 LBS | RESPIRATION RATE: 16 BRPM | DIASTOLIC BLOOD PRESSURE: 84 MMHG

## 2019-10-30 DIAGNOSIS — E03.9 ACQUIRED HYPOTHYROIDISM: ICD-10-CM

## 2019-10-30 DIAGNOSIS — Z23 NEED FOR VACCINATION: ICD-10-CM

## 2019-10-30 DIAGNOSIS — E78.2 MIXED HYPERLIPIDEMIA: ICD-10-CM

## 2019-10-30 DIAGNOSIS — I10 ESSENTIAL HYPERTENSION WITH GOAL BLOOD PRESSURE LESS THAN 140/90: Primary | ICD-10-CM

## 2019-10-30 PROCEDURE — 90662 IIV NO PRSV INCREASED AG IM: CPT | Performed by: INTERNAL MEDICINE

## 2019-10-30 PROCEDURE — 90471 IMMUNIZATION ADMIN: CPT | Performed by: INTERNAL MEDICINE

## 2019-10-30 PROCEDURE — 99214 OFFICE O/P EST MOD 30 MIN: CPT | Performed by: INTERNAL MEDICINE

## 2019-10-30 NOTE — PROGRESS NOTES
Unknown Ervin is a 80year old female. HPI:     1. Essential hypertension with goal blood pressure less than 140/90    Patient has been following low salt diet and has been taking anti-hypertensive prescriptions as prescribed.  Blood pressure has been check tablet (5 mg total) by mouth daily. , Disp: 30 tablet, Rfl: 1       Past Medical History:   Diagnosis Date   • Actinic keratosis 08/2019    Lower right cheek   • Basal cell carcinoma 11/2017    right eyelid   • [facility] 3/9/2016   • Carcinoma of auscultation  CARDIO: RRR without murmur  GI: good BS's,no masses, HSM or tenderness  EXTREMITIES: no cyanosis, clubbing or edema  Has sling on left upper arm. ASSESSMENT AND PLAN:     1.  Essential hypertension with goal blood pressure less than 140/90

## 2019-11-15 ENCOUNTER — LAB ENCOUNTER (OUTPATIENT)
Dept: LAB | Facility: HOSPITAL | Age: 84
End: 2019-11-15
Attending: INTERNAL MEDICINE
Payer: COMMERCIAL

## 2019-11-15 DIAGNOSIS — I10 ESSENTIAL HYPERTENSION WITH GOAL BLOOD PRESSURE LESS THAN 140/90: ICD-10-CM

## 2019-11-15 DIAGNOSIS — E78.2 MIXED HYPERLIPIDEMIA: ICD-10-CM

## 2019-11-15 PROCEDURE — 84443 ASSAY THYROID STIM HORMONE: CPT

## 2019-11-15 PROCEDURE — 80053 COMPREHEN METABOLIC PANEL: CPT

## 2019-11-15 PROCEDURE — 85025 COMPLETE CBC W/AUTO DIFF WBC: CPT

## 2019-11-15 PROCEDURE — 36415 COLL VENOUS BLD VENIPUNCTURE: CPT

## 2019-11-15 PROCEDURE — 81001 URINALYSIS AUTO W/SCOPE: CPT

## 2019-11-15 PROCEDURE — 80061 LIPID PANEL: CPT

## 2019-11-19 ENCOUNTER — OFFICE VISIT (OUTPATIENT)
Dept: DERMATOLOGY CLINIC | Facility: CLINIC | Age: 84
End: 2019-11-19

## 2019-11-19 DIAGNOSIS — L57.0 ACTINIC KERATOSIS: Primary | ICD-10-CM

## 2019-11-19 PROCEDURE — 17000 DESTRUCT PREMALG LESION: CPT | Performed by: DERMATOLOGY

## 2019-11-19 RX ORDER — TOBRAMYCIN AND DEXAMETHASONE 3; 1 MG/ML; MG/ML
SUSPENSION/ DROPS OPHTHALMIC
Refills: 0 | COMMUNITY
Start: 2019-11-04 | End: 2020-02-20 | Stop reason: ALTCHOICE

## 2019-11-19 NOTE — PROGRESS NOTES
HPI:     Chief Complaint     Derm Problem        HPI     Derm Problem      Additional comments: LOV 8/19/19. pt presenting 3 month f/u fto R cheek. Shave BX done at last visit. no new concerns.  pt has HX of Ak's,BCC          Last edited by Hammad Yen • Malignant neoplasm of overlapping sites of right breast in female, estrogen receptor positive ([address] 3/17/2016   • Mass 2013    excision mass LRF   • MGD (meibomian gland dysfunction) 2011    OU   • Osteopenia determined by x-ray 10/18/2007   • Zee Mad[place] Not on file        Minutes per session: Not on file      Stress: Not on file    Relationships      Social connections:        Talks on phone: Not on file        Gets together: Not on file        Attends Christianity service: Not on file        Active member o no family h/o hyperlipidemia and vascular disease   • Glaucoma Neg    • Macular degeneration Neg        PHYSICAL EXAM:   Patient is alert and oriented and appears their stated age. They are well-nourished. Exam is remarkable for the following-  1.   There

## 2019-12-21 NOTE — TELEPHONE ENCOUNTER
Please review; protocol failed.      Requested Prescriptions     Pending Prescriptions Disp Refills   • AMLODIPINE BESYLATE 10 MG Oral Tab [Pharmacy Med Name: AMLODIPINE BESYLATE 10MG TABLETS] 90 tablet 0     Sig: TAKE 1 TABLET BY MOUTH DAILY         Recent

## 2019-12-23 RX ORDER — AMLODIPINE BESYLATE 10 MG/1
TABLET ORAL
Qty: 90 TABLET | Refills: 1 | Status: SHIPPED | OUTPATIENT
Start: 2019-12-23 | End: 2020-04-15

## 2019-12-30 RX ORDER — AMLODIPINE BESYLATE 10 MG/1
TABLET ORAL
Qty: 90 TABLET | Refills: 1 | OUTPATIENT
Start: 2019-12-30

## 2020-02-04 ENCOUNTER — TELEPHONE (OUTPATIENT)
Dept: INTERNAL MEDICINE CLINIC | Facility: CLINIC | Age: 85
End: 2020-02-04

## 2020-02-04 DIAGNOSIS — Z85.43 PERSONAL HISTORY OF MALIGNANT NEOPLASM OF OVARY: Primary | ICD-10-CM

## 2020-02-04 NOTE — TELEPHONE ENCOUNTER
Dr. Sathish Bryan, patient is requesting a referral for Dr. Eduard Adams. Referral has been pended, please advise.      Please reply to pool: EM TRIAGE SUPPORT

## 2020-02-04 NOTE — TELEPHONE ENCOUNTER
Patient called in requesting a new referral for oncology,     She states that this is her annual follow up. Dr. Tatiana Veronica  [phone] [place], [place]  # [phone]    Patient scheduled for 2/20 at 2pm    Please advise.

## 2020-02-06 NOTE — TELEPHONE ENCOUNTER
mir acknowledged    Referral   Message [id number]   From  Marylen Stamp To  Keyla Nuñez and Delivered  2/4/2020  4:27 PM   Last Read in [address]  2/5/2020 10:20 AM by Nas Cox

## 2020-02-20 ENCOUNTER — OFFICE VISIT (OUTPATIENT)
Dept: HEMATOLOGY/ONCOLOGY | Facility: HOSPITAL | Age: 85
End: 2020-02-20
Attending: INTERNAL MEDICINE
Payer: COMMERCIAL

## 2020-02-20 VITALS
OXYGEN SATURATION: 99 % | TEMPERATURE: 98 F | HEIGHT: 63 IN | BODY MASS INDEX: 23.21 KG/M2 | DIASTOLIC BLOOD PRESSURE: 70 MMHG | HEART RATE: 67 BPM | RESPIRATION RATE: 16 BRPM | WEIGHT: 131 LBS | SYSTOLIC BLOOD PRESSURE: 144 MMHG

## 2020-02-20 DIAGNOSIS — Z17.0 MALIGNANT NEOPLASM OF OVERLAPPING SITES OF RIGHT BREAST IN FEMALE, ESTROGEN RECEPTOR POSITIVE (HCC): Primary | ICD-10-CM

## 2020-02-20 DIAGNOSIS — Z51.81 ENCOUNTER FOR MONITORING AROMATASE INHIBITOR THERAPY: ICD-10-CM

## 2020-02-20 DIAGNOSIS — Z79.811 ENCOUNTER FOR MONITORING AROMATASE INHIBITOR THERAPY: ICD-10-CM

## 2020-02-20 DIAGNOSIS — C56.9 MALIGNANT NEOPLASM OF OVARY, UNSPECIFIED LATERALITY (HCC): ICD-10-CM

## 2020-02-20 DIAGNOSIS — C50.811 MALIGNANT NEOPLASM OF OVERLAPPING SITES OF RIGHT BREAST IN FEMALE, ESTROGEN RECEPTOR POSITIVE (HCC): Primary | ICD-10-CM

## 2020-02-20 DIAGNOSIS — M85.80 OSTEOPENIA DETERMINED BY X-RAY: ICD-10-CM

## 2020-02-20 DIAGNOSIS — Z90.11 HX OF RIGHT MASTECTOMY: ICD-10-CM

## 2020-02-20 PROCEDURE — 99214 OFFICE O/P EST MOD 30 MIN: CPT | Performed by: INTERNAL MEDICINE

## 2020-02-20 NOTE — PROGRESS NOTES
HPI   2/20/2020 [place] Gerardo is a 80year old female who presents for follow-up of her right hormone receptor positive, HER-2/yusuf negative, pT1b N1(mi) M0 breast cancer. She underwent mastectomy 4/27/16.   She has continued anastrozole 1 mg daily, but as differentiated ductal carcinoma measuring 3 mm in greatest dimension; ductal carcinoma in situ, cribriform type, largest focus 3 mm.   ER 98%, NH 98%, HER-2/yusuf -, Ki-67 4%      4/14/2016 Cancer Staged     [facility] Genetic Testing-negative; no clinically sign neuropathy following taxol carboplatin for ovarian carcinoma   Hematological: Negative for adenopathy. Psychiatric/Behavioral: Negative for confusion.          Current Outpatient Medications   Medication Sig Dispense Refill   • AMLODIPINE BESYLATE 10 MG O Rotator cuff injury     right   • Vitreous floaters 2011    OD     Past Surgical History:   Procedure Laterality Date   • BILAT SALPINGO-OOPHORECT W/ OMENTECT, TOTAL ABDOM HYSTE  2006   • BIOPSY OF BREAST, NEEDLE CORE Right 3/9/16   • CATARACT EXTRACTION W partner: Not on file        Emotionally abused: Not on file        Physically abused: Not on file        Forced sexual activity: Not on file    Other Topics      Concerns:         Service: Not Asked        Blood Transfusions: Not Asked        Caffe well-nourished. HENT:   Head: Normocephalic and atraumatic. Eyes: Pupils are equal, round, and reactive to light. Conjunctivae and EOM are normal. No scleral icterus. Neck: Normal range of motion. Neck supple.    Cardiovascular: Normal rate, regular r in RAD51D (c.872G>A). She is entered in the registry and notified if this abnormality becomes significant.        Patient has a history of osteoporosis and was not able to tolerate bisphosphate therapy in the past.  She has osteopenia based on her bone den Monocytes Absolute      0.10 - 1.00 x10(3) uL 0.66   Eosinophils Absolute      0.00 - 0.70 x10(3) uL 0.13   Basophils Absolute      0.00 - 0.20 x10(3) uL 0.05   Immature Granulocyte Absolute      0.00 - 1.00 x10(3) uL 0.02   Neutrophils %      % 41.8   Ly labeled “Pauline, right upper eyelid lesion” and consists of a 0.9 x 0.4 cm pale tan, smooth and unremarkable skin fragment excised to a maximum depth of 0.1 cm. The excisional margin is inked red.  The specimen is serially sectioned and submitted entirely in [facility], DX DEXA SCAN OSTEOPOROSIS              EVAL, 11/30/2009, 18:33.   INDICATIONS: Postmenopausal and history of breast cancer.   TECHNIQUE:   Measurement of bone mineral density of the lumbar spine and               hip was performed on a Ho Fracture: 4.5 % Dictated by (CST): Giacomo De La Fuente MD on 6/13/2016 at  19:21     Approved by (CST): Giacomo De La Fuente MD on 6/13/2016 at 19:23     Meds This Visit:  See list above

## 2020-02-26 ENCOUNTER — OFFICE VISIT (OUTPATIENT)
Dept: INTERNAL MEDICINE CLINIC | Facility: CLINIC | Age: 85
End: 2020-02-26
Payer: MEDICARE

## 2020-02-26 VITALS
SYSTOLIC BLOOD PRESSURE: 128 MMHG | BODY MASS INDEX: 24.73 KG/M2 | DIASTOLIC BLOOD PRESSURE: 72 MMHG | HEIGHT: 61 IN | RESPIRATION RATE: 16 BRPM | HEART RATE: 58 BPM | WEIGHT: 131 LBS

## 2020-02-26 DIAGNOSIS — E03.9 ACQUIRED HYPOTHYROIDISM: ICD-10-CM

## 2020-02-26 DIAGNOSIS — I10 ESSENTIAL HYPERTENSION WITH GOAL BLOOD PRESSURE LESS THAN 140/90: Primary | ICD-10-CM

## 2020-02-26 DIAGNOSIS — E78.2 MIXED HYPERLIPIDEMIA: ICD-10-CM

## 2020-02-26 DIAGNOSIS — N18.30 KIDNEY DISEASE, CHRONIC, STAGE III (GFR 30-59 ML/MIN) (HCC): ICD-10-CM

## 2020-02-26 PROCEDURE — G0463 HOSPITAL OUTPT CLINIC VISIT: HCPCS | Performed by: INTERNAL MEDICINE

## 2020-02-26 PROCEDURE — 99214 OFFICE O/P EST MOD 30 MIN: CPT | Performed by: INTERNAL MEDICINE

## 2020-02-26 NOTE — PROGRESS NOTES
Lydai Soto is a 80year old female. HPI:     1. Essential hypertension with goal blood pressure less than 140/90    Patient has been following low salt diet and has been taking anti-hypertensive prescriptions as prescribed.  Blood pressure has been check TABLET BY MOUTH DAILY 90 tablet 3   • Multiple Vitamin (ONE-DAILY MULTI VITAMINS) Oral Tab Take 1 tablet by mouth daily. • Cholecalciferol (VITAMIN D) 1000 UNITS Oral Cap Take 1,000 Units by mouth daily.         Past Medical History:   Diagnosis Date breast wound well healed.    HEENT: atraumatic, normocephalic,ears and throat are clear  NECK: supple,no adenopathy, no bruits  LUNGS: clear to auscultation  CARDIO: RRR without murmur  GI: good BS's,no masses, HSM or tenderness  EXTREMITIES: no cyanosis, c

## 2020-04-15 DIAGNOSIS — C50.811 MALIGNANT NEOPLASM OF OVERLAPPING SITES OF RIGHT BREAST IN FEMALE, ESTROGEN RECEPTOR POSITIVE: ICD-10-CM

## 2020-04-15 DIAGNOSIS — Z79.811 ENCOUNTER FOR MONITORING AROMATASE INHIBITOR THERAPY: ICD-10-CM

## 2020-04-15 DIAGNOSIS — Z51.81 ENCOUNTER FOR MONITORING AROMATASE INHIBITOR THERAPY: ICD-10-CM

## 2020-04-15 DIAGNOSIS — Z17.0 MALIGNANT NEOPLASM OF OVERLAPPING SITES OF RIGHT BREAST IN FEMALE, ESTROGEN RECEPTOR POSITIVE: ICD-10-CM

## 2020-04-15 RX ORDER — AMLODIPINE BESYLATE 10 MG/1
10 TABLET ORAL DAILY
Qty: 90 TABLET | Refills: 1 | Status: SHIPPED | OUTPATIENT
Start: 2020-04-15 | End: 2020-08-25

## 2020-04-15 RX ORDER — ANASTROZOLE 1 MG/1
TABLET ORAL
Qty: 90 TABLET | Refills: 3 | Status: SHIPPED | OUTPATIENT
Start: 2020-04-15 | End: 2020-10-09

## 2020-04-15 NOTE — TELEPHONE ENCOUNTER
This is being sent to you without review by the Triage staff due to the high call volumes created by the COVID-19 virus, per the email sent by Dr. Niurka Fajardo on 3/19/20. Thank you for your support.     Centralized Nurse Triage Team

## 2020-04-15 NOTE — TELEPHONE ENCOUNTER
Patient is requesting a refill for AMLODIPINE BESYLATE 10 MG Oral Tab. She is changing pharmacy from [facility] to [facility] in [employer] and would like medications to be switched to new pharmacy.     Current Outpatient Medications   Medication Sig Dispense Refill

## 2020-06-23 ENCOUNTER — OFFICE VISIT (OUTPATIENT)
Dept: DERMATOLOGY CLINIC | Facility: CLINIC | Age: 85
End: 2020-06-23
Payer: MEDICARE

## 2020-06-23 DIAGNOSIS — L57.8 SUN-DAMAGED SKIN: ICD-10-CM

## 2020-06-23 DIAGNOSIS — Z85.828 PERSONAL HISTORY OF SKIN CANCER: Primary | ICD-10-CM

## 2020-06-23 DIAGNOSIS — D23.70 BENIGN NEOPLASM OF SKIN OF LOWER EXTREMITY, INCLUDING HIP, UNSPECIFIED LATERALITY: ICD-10-CM

## 2020-06-23 DIAGNOSIS — L81.4 SOLAR LENTIGO: ICD-10-CM

## 2020-06-23 DIAGNOSIS — D23.60 BENIGN NEOPLASM OF SKIN OF UPPER EXTREMITY AND SHOULDER, UNSPECIFIED LATERALITY: ICD-10-CM

## 2020-06-23 DIAGNOSIS — D23.5 BENIGN NEOPLASM OF SKIN OF TRUNK, EXCEPT SCROTUM: ICD-10-CM

## 2020-06-23 DIAGNOSIS — D23.4 BENIGN NEOPLASM OF SCALP AND SKIN OF NECK: ICD-10-CM

## 2020-06-23 DIAGNOSIS — B35.3 TINEA PEDIS OF BOTH FEET: ICD-10-CM

## 2020-06-23 DIAGNOSIS — L82.1 SEBORRHEIC KERATOSES: ICD-10-CM

## 2020-06-23 DIAGNOSIS — D23.30 BENIGN NEOPLASM OF SKIN OF FACE: ICD-10-CM

## 2020-06-23 PROCEDURE — G0463 HOSPITAL OUTPT CLINIC VISIT: HCPCS | Performed by: DERMATOLOGY

## 2020-06-23 PROCEDURE — 99213 OFFICE O/P EST LOW 20 MIN: CPT | Performed by: DERMATOLOGY

## 2020-06-23 RX ORDER — KETOCONAZOLE 20 MG/G
CREAM TOPICAL
Qty: 60 G | Refills: 1 | Status: SHIPPED | OUTPATIENT
Start: 2020-06-23 | End: 2020-10-13

## 2020-06-23 NOTE — PROGRESS NOTES
HPI:     Chief Complaint     Full Skin Exam        HPI     Full Skin Exam      Additional comments: LOV 11/19/2019 Patient present for full body skin exam . Patient has hx of [address] [employer] [employer] [place]          Last edited by Jan Hartmann, [address] on 6/23/2020  9:07 AM. (Histo MULTI VITAMINS) Oral Tab Take 1 tablet by mouth daily. • Cholecalciferol (VITAMIN D) 1000 UNITS Oral Cap Take 1,000 Units by mouth daily.        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 Diagnosis discussed  Sun-damaged skin-proper use and application of broad-spectrum sunblock SPF 30 or higher discussed.   Patient understands t

## 2020-06-25 ENCOUNTER — TELEPHONE (OUTPATIENT)
Dept: INTERNAL MEDICINE CLINIC | Facility: CLINIC | Age: 85
End: 2020-06-25

## 2020-06-25 RX ORDER — LOVASTATIN 40 MG/1
40 TABLET ORAL NIGHTLY
Qty: 90 TABLET | Refills: 1 | Status: SHIPPED | OUTPATIENT
Start: 2020-06-25 | End: 2021-01-05

## 2020-06-25 NOTE — TELEPHONE ENCOUNTER
Pt would like a refill on lovastatin. Per pt she is out of medication.  Pharmacy: [phone] (listed)     Current Outpatient Medications   Medication Sig Dispense Refill   • LOVASTATIN 40 MG Oral Tab TAKE 1 TABLET BY MOUTH NIGHTLY 90 tablet 1

## 2020-06-26 NOTE — TELEPHONE ENCOUNTER
Called patient and relayed refill message. Patient verbalized understanding and had no further questions.

## 2020-08-24 NOTE — TELEPHONE ENCOUNTER
Patient called in requesting medications below. She states that she only has 2 pills left of medications. Confirmed pharmacy on the encounter. Please advise.        Current Outpatient Medications:   •  amLODIPine Besylate 10 MG Oral Tab, Take 1 tabl

## 2020-08-25 RX ORDER — AMLODIPINE BESYLATE 10 MG/1
TABLET ORAL
Qty: 90 TABLET | Refills: 1 | Status: SHIPPED | OUTPATIENT
Start: 2020-08-25 | End: 2021-03-08

## 2020-09-02 RX ORDER — LEVOTHYROXINE SODIUM 88 UG/1
88 TABLET ORAL
Qty: 90 TABLET | Refills: 3 | Status: SHIPPED | OUTPATIENT
Start: 2020-09-02 | End: 2020-11-12

## 2020-09-02 NOTE — TELEPHONE ENCOUNTER
Updated her [facility]  Patient Customer Service Request Pool 15 hours ago (5:59 PM)         Topic: Selection     Since early spring I have contacted the office by phone to have my prescriptions transferred to [facility] [address]

## 2020-09-09 ENCOUNTER — HOSPITAL ENCOUNTER (OUTPATIENT)
Dept: MAMMOGRAPHY | Facility: HOSPITAL | Age: 85
Discharge: HOME | End: 2020-09-09
Attending: INTERNAL MEDICINE
Payer: MEDICARE

## 2020-09-09 DIAGNOSIS — Z12.31 ENCOUNTER FOR SCREENING MAMMOGRAM FOR MALIGNANT NEOPLASM OF BREAST: ICD-10-CM

## 2020-09-09 PROCEDURE — 77063 BREAST TOMOSYNTHESIS BI: CPT | Performed by: INTERNAL MEDICINE

## 2020-09-09 PROCEDURE — 77067 SCR MAMMO BI INCL CAD: CPT | Performed by: INTERNAL MEDICINE

## 2020-09-09 RX ORDER — METOPROLOL TARTRATE 50 MG/1
50 TABLET, FILM COATED ORAL 2 TIMES DAILY
Qty: 180 TABLET | Refills: 1 | Status: SHIPPED | OUTPATIENT
Start: 2020-09-09 | End: 2020-11-12

## 2020-09-09 NOTE — TELEPHONE ENCOUNTER
Pharmacy called to request a refill for the following medication.     METOPROLOL TARTRATE 50 MG Oral Tab

## 2020-10-09 DIAGNOSIS — Z51.81 ENCOUNTER FOR MONITORING AROMATASE INHIBITOR THERAPY: ICD-10-CM

## 2020-10-09 DIAGNOSIS — Z79.811 ENCOUNTER FOR MONITORING AROMATASE INHIBITOR THERAPY: ICD-10-CM

## 2020-10-09 DIAGNOSIS — C50.811 MALIGNANT NEOPLASM OF OVERLAPPING SITES OF RIGHT BREAST IN FEMALE, ESTROGEN RECEPTOR POSITIVE (HCC): ICD-10-CM

## 2020-10-09 DIAGNOSIS — Z17.0 MALIGNANT NEOPLASM OF OVERLAPPING SITES OF RIGHT BREAST IN FEMALE, ESTROGEN RECEPTOR POSITIVE (HCC): ICD-10-CM

## 2020-10-09 RX ORDER — ANASTROZOLE 1 MG/1
1 TABLET ORAL DAILY
Qty: 90 TABLET | Refills: 3 | Status: SHIPPED | OUTPATIENT
Start: 2020-10-09

## 2020-10-13 ENCOUNTER — OFFICE VISIT (OUTPATIENT)
Dept: INTERNAL MEDICINE CLINIC | Facility: CLINIC | Age: 85
End: 2020-10-13
Payer: MEDICARE

## 2020-10-13 VITALS
HEIGHT: 63 IN | RESPIRATION RATE: 16 BRPM | DIASTOLIC BLOOD PRESSURE: 61 MMHG | BODY MASS INDEX: 21.97 KG/M2 | SYSTOLIC BLOOD PRESSURE: 153 MMHG | HEART RATE: 64 BPM | WEIGHT: 124 LBS

## 2020-10-13 DIAGNOSIS — N18.31 STAGE 3A CHRONIC KIDNEY DISEASE (HCC): ICD-10-CM

## 2020-10-13 DIAGNOSIS — E03.9 ACQUIRED HYPOTHYROIDISM: ICD-10-CM

## 2020-10-13 DIAGNOSIS — I10 ESSENTIAL HYPERTENSION WITH GOAL BLOOD PRESSURE LESS THAN 140/90: Primary | ICD-10-CM

## 2020-10-13 DIAGNOSIS — E78.2 MIXED HYPERLIPIDEMIA: ICD-10-CM

## 2020-10-13 PROCEDURE — 99214 OFFICE O/P EST MOD 30 MIN: CPT | Performed by: INTERNAL MEDICINE

## 2020-10-13 PROCEDURE — G0463 HOSPITAL OUTPT CLINIC VISIT: HCPCS | Performed by: INTERNAL MEDICINE

## 2020-10-13 NOTE — PROGRESS NOTES
Tutu Devi is a 80year old female. HPI:     1. Essential hypertension with goal blood pressure less than 140/90    Patient has been following low salt diet and has been taking anti-hypertensive prescriptions as prescribed.  Blood pressure has been check (DITROPAN XL) 5 MG Oral Tablet 24 Hr Take 1 tablet (5 mg total) by mouth daily. 30 tablet 1   • Multiple Vitamin (ONE-DAILY MULTI VITAMINS) Oral Tab Take 1 tablet by mouth daily.      • Cholecalciferol (VITAMIN D) 1000 UNITS Oral Cap Take 1,000 Units by mathew kg)   BMI 21.97 kg/m²     GENERAL: well developed, well nourished, in no apparent distress  SKIN: no rashes,no suspicious lesions/ Right breast wound well healed.    HEENT: atraumatic, normocephalic,ears and throat are clear  NECK: supple,no adenopathy, no

## 2020-11-12 RX ORDER — METOPROLOL TARTRATE 50 MG/1
50 TABLET, FILM COATED ORAL 2 TIMES DAILY
Qty: 180 TABLET | Refills: 1 | Status: SHIPPED | OUTPATIENT
Start: 2020-11-12 | End: 2021-05-13

## 2020-11-13 RX ORDER — LEVOTHYROXINE SODIUM 88 UG/1
88 TABLET ORAL
Qty: 90 TABLET | Refills: 3 | Status: SHIPPED | OUTPATIENT
Start: 2020-11-13

## 2020-12-15 ENCOUNTER — OFFICE VISIT (OUTPATIENT)
Dept: INTERNAL MEDICINE CLINIC | Facility: CLINIC | Age: 85
End: 2020-12-15
Payer: MEDICARE

## 2020-12-15 VITALS
WEIGHT: 127 LBS | SYSTOLIC BLOOD PRESSURE: 153 MMHG | HEART RATE: 75 BPM | HEIGHT: 60 IN | DIASTOLIC BLOOD PRESSURE: 65 MMHG | RESPIRATION RATE: 16 BRPM | BODY MASS INDEX: 24.94 KG/M2

## 2020-12-15 DIAGNOSIS — I10 ESSENTIAL HYPERTENSION WITH GOAL BLOOD PRESSURE LESS THAN 140/90: ICD-10-CM

## 2020-12-15 DIAGNOSIS — R60.0 LEG EDEMA, LEFT: Primary | ICD-10-CM

## 2020-12-15 DIAGNOSIS — E78.2 MIXED HYPERLIPIDEMIA: ICD-10-CM

## 2020-12-15 DIAGNOSIS — E03.9 ACQUIRED HYPOTHYROIDISM: ICD-10-CM

## 2020-12-15 PROCEDURE — G0463 HOSPITAL OUTPT CLINIC VISIT: HCPCS | Performed by: INTERNAL MEDICINE

## 2020-12-15 PROCEDURE — 99214 OFFICE O/P EST MOD 30 MIN: CPT | Performed by: INTERNAL MEDICINE

## 2020-12-15 NOTE — PROGRESS NOTES
Raisa Herrera is a 80year old female. HPI:     1. Left leg swelling    The patient has developed swelling of the entire left lower extremity of the past several months. Family has noted this and the swelling is worse in the morning.   She also notes a nod kg)  07/23/19 : 129 lb (58.5 kg)    3. Mixed Hyperlipidemia    Patient has been following low cholesterol diet and has been taking and tolerating Cholesterol medicine as prescribed.  No serious side effects such as rash, muscle tenderness or weakness have b Malignant neoplasm of overlapping sites of right breast in female, estrogen receptor positive ([facility] [address] 3/17/2016   • Mass 2013    excision mass LRF   • MGD (meibomian gland dysfunction) 2011    OU   • Osteopenia determined by x-ray 10/18/2007   • Osteoporosis groin area which has grown over this time. And is tender to touch.   The concern that the patient may have a lymph node in this area or some blockage of the lymphatic system so an ultrasound of the left lower extremity in the groin will be done to rule out

## 2020-12-17 ENCOUNTER — LAB ENCOUNTER (OUTPATIENT)
Dept: LAB | Facility: HOSPITAL | Age: 85
End: 2020-12-17
Attending: INTERNAL MEDICINE
Payer: MEDICARE

## 2020-12-17 ENCOUNTER — TELEPHONE (OUTPATIENT)
Dept: INTERNAL MEDICINE CLINIC | Facility: CLINIC | Age: 85
End: 2020-12-17

## 2020-12-17 ENCOUNTER — HOSPITAL ENCOUNTER (OUTPATIENT)
Dept: ULTRASOUND IMAGING | Facility: HOSPITAL | Age: 85
Discharge: HOME | End: 2020-12-17
Attending: INTERNAL MEDICINE
Payer: MEDICARE

## 2020-12-17 DIAGNOSIS — I10 ESSENTIAL HYPERTENSION WITH GOAL BLOOD PRESSURE LESS THAN 140/90: ICD-10-CM

## 2020-12-17 DIAGNOSIS — E78.2 MIXED HYPERLIPIDEMIA: ICD-10-CM

## 2020-12-17 DIAGNOSIS — R60.0 LEG EDEMA, LEFT: ICD-10-CM

## 2020-12-17 PROCEDURE — 80053 COMPREHEN METABOLIC PANEL: CPT

## 2020-12-17 PROCEDURE — 85025 COMPLETE CBC W/AUTO DIFF WBC: CPT

## 2020-12-17 PROCEDURE — 93971 EXTREMITY STUDY: CPT | Performed by: INTERNAL MEDICINE

## 2020-12-17 PROCEDURE — 80061 LIPID PANEL: CPT

## 2020-12-17 PROCEDURE — 81001 URINALYSIS AUTO W/SCOPE: CPT

## 2020-12-17 PROCEDURE — 84443 ASSAY THYROID STIM HORMONE: CPT

## 2020-12-17 PROCEDURE — 36415 COLL VENOUS BLD VENIPUNCTURE: CPT

## 2020-12-17 RX ORDER — CIPROFLOXACIN 250 MG/1
250 TABLET, FILM COATED ORAL 2 TIMES DAILY
Qty: 6 TABLET | Refills: 0 | Status: SHIPPED | OUTPATIENT
Start: 2020-12-17 | End: 2020-12-20

## 2020-12-17 NOTE — TELEPHONE ENCOUNTER
The patient was called and looks like she has a urinary tract infection. Her kidney function is depressed with level 3B on the kidneys.   Her left swelling appears about the same and ultrasound was reviewed does not show any clots in the left leg but just

## 2020-12-23 ENCOUNTER — OFFICE VISIT (OUTPATIENT)
Dept: HEMATOLOGY/ONCOLOGY | Facility: HOSPITAL | Age: 85
End: 2020-12-23
Attending: INTERNAL MEDICINE
Payer: MEDICARE

## 2020-12-23 VITALS
RESPIRATION RATE: 16 BRPM | BODY MASS INDEX: 22.5 KG/M2 | DIASTOLIC BLOOD PRESSURE: 68 MMHG | WEIGHT: 127 LBS | TEMPERATURE: 98 F | OXYGEN SATURATION: 96 % | SYSTOLIC BLOOD PRESSURE: 169 MMHG | HEART RATE: 62 BPM | HEIGHT: 62.99 IN

## 2020-12-23 DIAGNOSIS — C56.3 MALIGNANT NEOPLASM OF BOTH OVARIES (HCC): ICD-10-CM

## 2020-12-23 DIAGNOSIS — G62.0 PERIPHERAL NEUROPATHY DUE TO CHEMOTHERAPY (HCC): ICD-10-CM

## 2020-12-23 DIAGNOSIS — R60.0 LEG EDEMA, LEFT: ICD-10-CM

## 2020-12-23 DIAGNOSIS — Z17.0 MALIGNANT NEOPLASM OF OVERLAPPING SITES OF RIGHT BREAST IN FEMALE, ESTROGEN RECEPTOR POSITIVE (HCC): Primary | ICD-10-CM

## 2020-12-23 DIAGNOSIS — Z51.81 ENCOUNTER FOR MONITORING AROMATASE INHIBITOR THERAPY: ICD-10-CM

## 2020-12-23 DIAGNOSIS — M85.80 OSTEOPENIA DETERMINED BY X-RAY: ICD-10-CM

## 2020-12-23 DIAGNOSIS — T45.1X5A PERIPHERAL NEUROPATHY DUE TO CHEMOTHERAPY (HCC): ICD-10-CM

## 2020-12-23 DIAGNOSIS — C50.811 MALIGNANT NEOPLASM OF OVERLAPPING SITES OF RIGHT BREAST IN FEMALE, ESTROGEN RECEPTOR POSITIVE (HCC): Primary | ICD-10-CM

## 2020-12-23 DIAGNOSIS — Z79.811 ENCOUNTER FOR MONITORING AROMATASE INHIBITOR THERAPY: ICD-10-CM

## 2020-12-23 PROCEDURE — 99214 OFFICE O/P EST MOD 30 MIN: CPT | Performed by: INTERNAL MEDICINE

## 2020-12-23 NOTE — PROGRESS NOTES
HPI   12/23/2020  Chikis Dyson  is a [address]year old female who presents for follow-up of her right hormone receptor positive, HER-2/yusuf negative, pT1b N1(mi) M0 breast cancer. She underwent mastectomy 4/27/16.   She has continued anastrozole 1 mg daily, but [facility]     3/9/2016 Biopsy    Right breast, 9 o'clock, 4 cm from nipple, ultrasound guided core biopsy-infiltrating ductal carcinoma, nuclear grade 1. Largest continuous tumor focus measures 7 mm.   ER 98%, MO 95%, HER-2/yusuf negative, Ki-67 12%     3 Negative for dysuria and hematuria.    Musculoskeletal:        Status post left humerus fracture 12/2018  S/p excision from the L 4th finger - benign  Arthritis aches and pains, but lower back pain 8/10 during bowel movement and bending    Skin: Negative fo right breast in female, estrogen receptor positive ([facility] [address] 3/17/2016   • Mass 2013    excision mass LRF   • MGD (meibomian gland dysfunction) 2011    OU   • Osteopenia determined by x-ray 10/18/2007   • Osteoporosis    • Ovarian cancer ([facility] [address] 2006    stage 3C file      Stress: Not on file    Relationships      Social connections        Talks on phone: Not on file        Gets together: Not on file        Attends [employer] service: Not on file        Active member of club or organization: Not on file        Atten • Glaucoma Neg    • Macular degeneration Neg          PHYSICAL EXAM:      BP (!) 169/68 (BP Location: Left arm, Patient Position: Sitting, Cuff Size: adult)   Pulse 62   Temp 97.7 °F (36.5 °C) (Oral)   Resp 16   Ht 1.6 m (5' 2.99\")   Wt 57.6 kg (127 lb) inhibitor therapy  Osteopenia determined by x-ray  Peripheral neuropathy due to chemotherapy (hcc)  Leg edema, left     The patient has hormone receptor positive pT1b(m) N1mi M0 right breast cancer and is continuing hormonal therapy with anastrozole 1 mg d 4.66   Hemoglobin      12.0 - 16.0 g/dL  13.5 14.1   Hematocrit      35.0 - 48.0 %  41.7 43.0   MCV      80.0 - 100.0 fL  91.6 92.3   MCH      26.0 - 34.0 pg  29.7 30.3   MCHC      31.0 - 37.0 g/dL  32.4 32.8   RDW-SD      35.1 - 46.3 fL  42.5 43.3   RDW Color Urine      Yellow  Yellow Yellow   CLARITY URINE      Clear  Cloudy (A) Clear   Spec Gravity      1.002 - 1.035  1.015 1.011   Glucose Urine      Negative mg/dL  Negative Negative   Bilirubin      Negative  Negative Negative   Ketones, UA      Nega deep tissue edge. Electronically signed by Farideh Butterfield MD on 11/15/2017 at [address] [place] [address]   L18.0 Neoplasm Of Uncertain Behavior Of Skin Of Eyelid.         Gross Description   The specimen is submitted in formalin labeled Usman Caraballo,

## 2020-12-28 ENCOUNTER — LAB ENCOUNTER (OUTPATIENT)
Dept: LAB | Facility: HOSPITAL | Age: 85
End: 2020-12-28
Attending: PHYSICIAN ASSISTANT
Payer: MEDICARE

## 2020-12-28 DIAGNOSIS — R94.4 DECREASED GFR: ICD-10-CM

## 2020-12-28 DIAGNOSIS — E87.6 HYPOKALEMIA: ICD-10-CM

## 2020-12-28 LAB
ANION GAP SERPL CALC-SCNC: 6 MMOL/L (ref 0–18)
BUN BLD-MCNC: 19 MG/DL (ref 7–18)
BUN/CREAT SERPL: 17.6 (ref 10–20)
CALCIUM BLD-MCNC: 9.6 MG/DL (ref 8.5–10.1)
CHLORIDE SERPL-SCNC: 104 MMOL/L (ref 98–112)
CO2 SERPL-SCNC: 31 MMOL/L (ref 21–32)
CREAT BLD-MCNC: 1.08 MG/DL
GLUCOSE BLD-MCNC: 96 MG/DL (ref 70–99)
OSMOLALITY SERPL CALC.SUM OF ELEC: 294 MOSM/KG (ref 275–295)
PATIENT FASTING Y/N/NP: YES
POTASSIUM SERPL-SCNC: 3.7 MMOL/L (ref 3.5–5.1)
SODIUM SERPL-SCNC: 141 MMOL/L (ref 136–145)

## 2020-12-28 PROCEDURE — 36415 COLL VENOUS BLD VENIPUNCTURE: CPT

## 2020-12-28 PROCEDURE — 80048 BASIC METABOLIC PNL TOTAL CA: CPT

## 2020-12-30 ENCOUNTER — TELEPHONE (OUTPATIENT)
Dept: INTERNAL MEDICINE CLINIC | Facility: CLINIC | Age: 85
End: 2020-12-30

## 2020-12-30 DIAGNOSIS — E87.6 HYPOKALEMIA: Primary | ICD-10-CM

## 2020-12-30 NOTE — TELEPHONE ENCOUNTER
I see now the patient had blood work done yesterday and the potassium is now in the normal range. No potassium supplementation is required at this time.   Please let her know there is an order for another BMP but we can hold off on that and not do it for t

## 2021-01-05 DIAGNOSIS — Z51.81 ENCOUNTER FOR MONITORING AROMATASE INHIBITOR THERAPY: ICD-10-CM

## 2021-01-05 DIAGNOSIS — C50.811 MALIGNANT NEOPLASM OF OVERLAPPING SITES OF RIGHT BREAST IN FEMALE, ESTROGEN RECEPTOR POSITIVE: ICD-10-CM

## 2021-01-05 DIAGNOSIS — Z79.811 ENCOUNTER FOR MONITORING AROMATASE INHIBITOR THERAPY: ICD-10-CM

## 2021-01-05 DIAGNOSIS — Z17.0 MALIGNANT NEOPLASM OF OVERLAPPING SITES OF RIGHT BREAST IN FEMALE, ESTROGEN RECEPTOR POSITIVE: ICD-10-CM

## 2021-01-05 RX ORDER — ANASTROZOLE 1 MG/1
1 TABLET ORAL DAILY
Qty: 90 TABLET | Refills: 3 | OUTPATIENT
Start: 2021-01-05

## 2021-01-05 RX ORDER — LOVASTATIN 40 MG/1
40 TABLET ORAL NIGHTLY
Qty: 90 TABLET | Refills: 1 | Status: SHIPPED | OUTPATIENT
Start: 2021-01-05 | End: 2021-06-16

## 2021-01-12 ENCOUNTER — OFFICE VISIT (OUTPATIENT)
Dept: INTERNAL MEDICINE CLINIC | Facility: CLINIC | Age: 86
End: 2021-01-12
Payer: MEDICARE

## 2021-01-12 VITALS
BODY MASS INDEX: 24.35 KG/M2 | SYSTOLIC BLOOD PRESSURE: 127 MMHG | WEIGHT: 124 LBS | HEART RATE: 60 BPM | RESPIRATION RATE: 16 BRPM | HEIGHT: 60 IN | DIASTOLIC BLOOD PRESSURE: 70 MMHG

## 2021-01-12 DIAGNOSIS — I10 ESSENTIAL HYPERTENSION WITH GOAL BLOOD PRESSURE LESS THAN 140/90: ICD-10-CM

## 2021-01-12 DIAGNOSIS — R61 NIGHT SWEATS: Primary | ICD-10-CM

## 2021-01-12 DIAGNOSIS — R60.0 LEG EDEMA, LEFT: ICD-10-CM

## 2021-01-12 DIAGNOSIS — N18.31 STAGE 3A CHRONIC KIDNEY DISEASE (HCC): ICD-10-CM

## 2021-01-12 PROCEDURE — 99214 OFFICE O/P EST MOD 30 MIN: CPT | Performed by: INTERNAL MEDICINE

## 2021-01-12 NOTE — PROGRESS NOTES
Clive Hassan is a 80year old female. HPI:   1. Night sweats    Claims to have nitely sweats that drench her body and she wakes up wet. This is a new occurrence in the last few m0nths.  Feels quite cold during the day but sweats w/o temperature at nite whi by mouth daily.         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 Zapata Mediate BS's,no masses, HSM or tenderness  EXTREMITIES: no cyanosis, clubbing or edema  Has sling on left upper arm. ASSESSMENT AND PLAN:     1. Night sweats    Has been getting nite sweats. Has a fullness in left groin. Need to R/O lymphoma with B symptoms.  Re

## 2021-01-14 ENCOUNTER — HOSPITAL ENCOUNTER (OUTPATIENT)
Dept: ULTRASOUND IMAGING | Facility: HOSPITAL | Age: 86
Discharge: HOME | End: 2021-01-14
Attending: INTERNAL MEDICINE
Payer: MEDICARE

## 2021-01-14 ENCOUNTER — LAB ENCOUNTER (OUTPATIENT)
Dept: LAB | Facility: HOSPITAL | Age: 86
End: 2021-01-14
Attending: INTERNAL MEDICINE
Payer: MEDICARE

## 2021-01-14 DIAGNOSIS — R61 NIGHT SWEATS: ICD-10-CM

## 2021-01-14 DIAGNOSIS — C56.3 MALIGNANT NEOPLASM OF BOTH OVARIES (HCC): ICD-10-CM

## 2021-01-14 LAB
ALBUMIN SERPL-MCNC: 4 G/DL (ref 3.4–5)
ALBUMIN/GLOB SERPL: 1 {RATIO} (ref 1–2)
ALP LIVER SERPL-CCNC: 75 U/L
ALT SERPL-CCNC: 22 U/L
ANION GAP SERPL CALC-SCNC: 5 MMOL/L (ref 0–18)
AST SERPL-CCNC: 20 U/L (ref 15–37)
BASOPHILS # BLD AUTO: 0.04 X10(3) UL (ref 0–0.2)
BASOPHILS NFR BLD AUTO: 0.6 %
BILIRUB SERPL-MCNC: 0.5 MG/DL (ref 0.1–2)
BUN BLD-MCNC: 26 MG/DL (ref 7–18)
BUN/CREAT SERPL: 22.8 (ref 10–20)
CALCIUM BLD-MCNC: 9.9 MG/DL (ref 8.5–10.1)
CHLORIDE SERPL-SCNC: 104 MMOL/L (ref 98–112)
CO2 SERPL-SCNC: 31 MMOL/L (ref 21–32)
CREAT BLD-MCNC: 1.14 MG/DL
DEPRECATED RDW RBC AUTO: 42.2 FL (ref 35.1–46.3)
EOSINOPHIL # BLD AUTO: 0.05 X10(3) UL (ref 0–0.7)
EOSINOPHIL NFR BLD AUTO: 0.7 %
ERYTHROCYTE [DISTWIDTH] IN BLOOD BY AUTOMATED COUNT: 12.8 % (ref 11–15)
GLOBULIN PLAS-MCNC: 3.9 G/DL (ref 2.8–4.4)
GLUCOSE BLD-MCNC: 92 MG/DL (ref 70–99)
HCT VFR BLD AUTO: 40.1 %
HGB BLD-MCNC: 13.1 G/DL
IMM GRANULOCYTES # BLD AUTO: 0.02 X10(3) UL (ref 0–1)
IMM GRANULOCYTES NFR BLD: 0.3 %
LYMPHOCYTES # BLD AUTO: 2.28 X10(3) UL (ref 1–4)
LYMPHOCYTES NFR BLD AUTO: 31.8 %
M PROTEIN MFR SERPL ELPH: 7.9 G/DL (ref 6.4–8.2)
MCH RBC QN AUTO: 29.8 PG (ref 26–34)
MCHC RBC AUTO-ENTMCNC: 32.7 G/DL (ref 31–37)
MCV RBC AUTO: 91.1 FL
MONOCYTES # BLD AUTO: 0.62 X10(3) UL (ref 0.1–1)
MONOCYTES NFR BLD AUTO: 8.6 %
NEUTROPHILS # BLD AUTO: 4.16 X10 (3) UL (ref 1.5–7.7)
NEUTROPHILS # BLD AUTO: 4.16 X10(3) UL (ref 1.5–7.7)
NEUTROPHILS NFR BLD AUTO: 58 %
OSMOLALITY SERPL CALC.SUM OF ELEC: 294 MOSM/KG (ref 275–295)
PATIENT FASTING Y/N/NP: YES
PLATELET # BLD AUTO: 279 10(3)UL (ref 150–450)
POTASSIUM SERPL-SCNC: 3.4 MMOL/L (ref 3.5–5.1)
RBC # BLD AUTO: 4.4 X10(6)UL
SODIUM SERPL-SCNC: 140 MMOL/L (ref 136–145)
WBC # BLD AUTO: 7.2 X10(3) UL (ref 4–11)

## 2021-01-14 PROCEDURE — 85025 COMPLETE CBC W/AUTO DIFF WBC: CPT

## 2021-01-14 PROCEDURE — 36415 COLL VENOUS BLD VENIPUNCTURE: CPT

## 2021-01-14 PROCEDURE — 80053 COMPREHEN METABOLIC PANEL: CPT

## 2021-01-14 PROCEDURE — 76882 US LMTD JT/FCL EVL NVASC XTR: CPT | Performed by: INTERNAL MEDICINE

## 2021-02-19 ENCOUNTER — TELEPHONE (OUTPATIENT)
Dept: HEMATOLOGY/ONCOLOGY | Facility: HOSPITAL | Age: 86
End: 2021-02-19

## 2021-02-19 NOTE — TELEPHONE ENCOUNTER
Raisa Herrera would like to cancel the following appointments:     Alyce Dunn MD in [address] [place] [address] ([phone]), 2/22/2021  1:00 PM     Comments:  I have a cold and the weather

## 2021-02-19 NOTE — TELEPHONE ENCOUNTER
Rolan Jacinto at [phone] or [phone] patient has a cold per portal, Dr. Chris Ramachandran pt

## 2021-02-22 ENCOUNTER — APPOINTMENT (OUTPATIENT)
Dept: HEMATOLOGY/ONCOLOGY | Facility: HOSPITAL | Age: 86
End: 2021-02-22
Attending: INTERNAL MEDICINE
Payer: MEDICARE

## 2021-02-25 ENCOUNTER — TELEPHONE (OUTPATIENT)
Dept: HEMATOLOGY/ONCOLOGY | Facility: HOSPITAL | Age: 86
End: 2021-02-25

## 2021-02-25 NOTE — TELEPHONE ENCOUNTER
Raisa Herrera at [phone] or [phone] is not feeling well according to a message via Portal from the patient and she would like to cancel the following appointments:     Alyce Dunn MD in [address] [place] [address] ([id number]), 2/26/2021  9:30 AM

## 2021-02-25 NOTE — TELEPHONE ENCOUNTER
Pat Lyndsey, she is canceling apts with Dr Deja Guo due to after testing she found out that she has more ovarian related cancer in the pelvis. She has an apt with Dr Steph Oakley next Tuesday. She is upset that Dr Deja Guo has not gotten back to her.

## 2021-02-26 ENCOUNTER — APPOINTMENT (OUTPATIENT)
Dept: HEMATOLOGY/ONCOLOGY | Facility: HOSPITAL | Age: 86
End: 2021-02-26
Attending: INTERNAL MEDICINE
Payer: MEDICARE

## 2021-02-28 NOTE — TELEPHONE ENCOUNTER
Late documentation -I called the patient on 2/25/2021  I did return a call to the patient after reviewing her scans that were ordered by vascular surgery.   She will be a candidate for a pathologic diagnosis of the retroperitoneal lymphadenopathy

## 2021-03-02 DIAGNOSIS — C56.9 MALIGNANT NEOPLASM OF OVARY, UNSPECIFIED LATERALITY (HCC): Primary | ICD-10-CM

## 2021-03-05 ENCOUNTER — TELEPHONE (OUTPATIENT)
Dept: OBGYN CLINIC | Facility: CLINIC | Age: 86
End: 2021-03-05

## 2021-03-05 NOTE — TELEPHONE ENCOUNTER
Received via fax consult note dated 3/2/2021 from Dr. Paty Gooden at [facility]. To [place] for review.

## 2021-03-06 ENCOUNTER — TELEPHONE (OUTPATIENT)
Dept: INTERNAL MEDICINE CLINIC | Facility: CLINIC | Age: 86
End: 2021-03-06

## 2021-03-06 DIAGNOSIS — Z20.822 ENCOUNTER FOR LABORATORY TESTING FOR COVID-19 VIRUS: Primary | ICD-10-CM

## 2021-03-06 NOTE — TELEPHONE ENCOUNTER
Daughter, Maryam Roque contacted office. On FYI. Daughter is upset because patient needs a CT biopsy. She is asking for a covid test which needs to be done 3 days prior. She has not scheduled the CT yet.  ordered CT.  Advised to call their office

## 2021-03-08 ENCOUNTER — LAB ENCOUNTER (OUTPATIENT)
Dept: LAB | Facility: HOSPITAL | Age: 86
End: 2021-03-08
Attending: INTERNAL MEDICINE
Payer: MEDICARE

## 2021-03-08 DIAGNOSIS — Z20.822 ENCOUNTER FOR LABORATORY TESTING FOR COVID-19 VIRUS: ICD-10-CM

## 2021-03-08 LAB
DEPRECATED RDW RBC AUTO: 44.9 FL (ref 35.1–46.3)
ERYTHROCYTE [DISTWIDTH] IN BLOOD BY AUTOMATED COUNT: 13.6 % (ref 11–15)
HCT VFR BLD AUTO: 35.5 %
HGB BLD-MCNC: 11.6 G/DL
MCH RBC QN AUTO: 29.4 PG (ref 26–34)
MCHC RBC AUTO-ENTMCNC: 32.7 G/DL (ref 31–37)
MCV RBC AUTO: 89.9 FL
PLATELET # BLD AUTO: 408 10(3)UL (ref 150–450)
RBC # BLD AUTO: 3.95 X10(6)UL
WBC # BLD AUTO: 11.5 X10(3) UL (ref 4–11)

## 2021-03-08 PROCEDURE — 85027 COMPLETE CBC AUTOMATED: CPT | Performed by: RADIOLOGY

## 2021-03-08 PROCEDURE — 36415 COLL VENOUS BLD VENIPUNCTURE: CPT | Performed by: RADIOLOGY

## 2021-03-08 RX ORDER — MIDAZOLAM HYDROCHLORIDE 1 MG/ML
1 INJECTION INTRAMUSCULAR; INTRAVENOUS EVERY 5 MIN PRN
Status: DISCONTINUED | OUTPATIENT
Start: 2021-03-10 | End: 2021-03-12

## 2021-03-08 RX ORDER — NALOXONE HYDROCHLORIDE 0.4 MG/ML
80 INJECTION, SOLUTION INTRAMUSCULAR; INTRAVENOUS; SUBCUTANEOUS AS NEEDED
Status: DISCONTINUED | OUTPATIENT
Start: 2021-03-08 | End: 2021-03-12

## 2021-03-08 RX ORDER — AMLODIPINE BESYLATE 10 MG/1
10 TABLET ORAL DAILY
Qty: 90 TABLET | Refills: 1 | Status: SHIPPED | OUTPATIENT
Start: 2021-03-08

## 2021-03-08 RX ORDER — SODIUM CHLORIDE 9 MG/ML
INJECTION, SOLUTION INTRAVENOUS CONTINUOUS
Status: DISCONTINUED | OUTPATIENT
Start: 2021-03-10 | End: 2021-03-12

## 2021-03-08 RX ORDER — FLUMAZENIL 0.1 MG/ML
0.2 INJECTION, SOLUTION INTRAVENOUS AS NEEDED
Status: DISCONTINUED | OUTPATIENT
Start: 2021-03-08 | End: 2021-03-12

## 2021-03-08 NOTE — TELEPHONE ENCOUNTER
Reviewed the covid test order to patient's daughter- Kimmie (ASHLEY). Daughter states she will schedule prior to CT. Scheduling information sent via My Chart, unable to take information at this time.

## 2021-03-08 NOTE — TELEPHONE ENCOUNTER
Please pend a Covid test for the patient if she needs this in order to get the CAT scan done.   You can pended back to me for signature

## 2021-03-09 LAB — SARS-COV-2 RNA RESP QL NAA+PROBE: NOT DETECTED

## 2021-03-10 ENCOUNTER — HOSPITAL ENCOUNTER (OUTPATIENT)
Dept: CT IMAGING | Facility: HOSPITAL | Age: 86
Discharge: HOME | End: 2021-03-10
Attending: OBSTETRICS & GYNECOLOGY
Payer: MEDICARE

## 2021-03-10 VITALS
HEART RATE: 70 BPM | DIASTOLIC BLOOD PRESSURE: 70 MMHG | OXYGEN SATURATION: 96 % | HEIGHT: 60 IN | WEIGHT: 120 LBS | SYSTOLIC BLOOD PRESSURE: 144 MMHG | RESPIRATION RATE: 15 BRPM | BODY MASS INDEX: 23.56 KG/M2

## 2021-03-10 DIAGNOSIS — C56.9 MALIGNANT NEOPLASM OF OVARY, UNSPECIFIED LATERALITY (HCC): ICD-10-CM

## 2021-03-10 PROCEDURE — 88341 IMHCHEM/IMCYTCHM EA ADD ANTB: CPT | Performed by: OBSTETRICS & GYNECOLOGY

## 2021-03-10 PROCEDURE — 49180 BIOPSY ABDOMINAL MASS: CPT | Performed by: OBSTETRICS & GYNECOLOGY

## 2021-03-10 PROCEDURE — 88305 TISSUE EXAM BY PATHOLOGIST: CPT | Performed by: OBSTETRICS & GYNECOLOGY

## 2021-03-10 PROCEDURE — 99152 MOD SED SAME PHYS/QHP 5/>YRS: CPT | Performed by: OBSTETRICS & GYNECOLOGY

## 2021-03-10 PROCEDURE — 88342 IMHCHEM/IMCYTCHM 1ST ANTB: CPT | Performed by: OBSTETRICS & GYNECOLOGY

## 2021-03-10 PROCEDURE — 88334 PATH CONSLTJ SURG CYTO XM EA: CPT | Performed by: OBSTETRICS & GYNECOLOGY

## 2021-03-10 PROCEDURE — 96360 HYDRATION IV INFUSION INIT: CPT

## 2021-03-10 PROCEDURE — 99153 MOD SED SAME PHYS/QHP EA: CPT | Performed by: OBSTETRICS & GYNECOLOGY

## 2021-03-10 PROCEDURE — 77012 CT SCAN FOR NEEDLE BIOPSY: CPT | Performed by: OBSTETRICS & GYNECOLOGY

## 2021-03-10 PROCEDURE — 88333 PATH CONSLTJ SURG CYTO XM 1: CPT | Performed by: OBSTETRICS & GYNECOLOGY

## 2021-03-10 RX ORDER — MIDAZOLAM HYDROCHLORIDE 1 MG/ML
INJECTION INTRAMUSCULAR; INTRAVENOUS
Status: COMPLETED | OUTPATIENT
Start: 2021-03-10 | End: 2021-03-10

## 2021-03-10 RX ORDER — MIDAZOLAM HYDROCHLORIDE 1 MG/ML
INJECTION INTRAMUSCULAR; INTRAVENOUS
Status: DISPENSED
Start: 2021-03-10 | End: 2021-03-10

## 2021-03-10 RX ADMIN — MIDAZOLAM HYDROCHLORIDE 1 MG: 1 INJECTION INTRAMUSCULAR; INTRAVENOUS at 10:14:00

## 2021-03-10 RX ADMIN — SODIUM CHLORIDE: 9 INJECTION, SOLUTION INTRAVENOUS at 09:30:00

## 2021-03-10 NOTE — PRE-SEDATION ASSESSMENT
[place] [facility]  IR Pre-Procedure Sedation Assessment    History of snoring or sleep or apnea?    No    History of previous problems with anesthesia or sedation  No    Physical Findings:  Neck: nl ROM  CV: RRR  PULM: normal respiratory rate/effor

## 2021-03-10 NOTE — H&P
[address] Patient Status:  Outpatient    1930 MRN [id number]   Location [facility] Attending Jesi Overton MD   Hosp Day # 0 PCP Cielo Robertson MD     Admitting Diagnosis:   Re History:  Social History    Tobacco Use      Smoking status: Never Smoker      Smokeless tobacco: Never Used    Alcohol use: Not Currently      Comment: rarely       Family History:  Family History   Problem Relation Age of Onset   • Other (AAA) Father Warm, dry, no LE edema bilat  Neuro:  A+O x 3    Results:   Labs:  Recent Labs   Lab 03/08/21 1932   RBC 3.95   HGB 11.6*   HCT 35.5   MCV 89.9   MCH 29.4   MCHC 32.7   RDW 13.6   WBC 11.5*   .0     No results for input(s): PTP, INR, PTT in the last

## 2021-03-12 ENCOUNTER — TELEPHONE (OUTPATIENT)
Dept: HEMATOLOGY/ONCOLOGY | Facility: HOSPITAL | Age: 86
End: 2021-03-12

## 2021-03-12 NOTE — TELEPHONE ENCOUNTER
Pathology from Dr. Jose A Lang biopsy available - consistent with previous ovarian - CT 3.7 x 3.5 cm L periaortic mass, etc  Call to home phone number - spoke with a family member - indicated that I was calling about her biopsy - she was not at home and would b

## 2021-03-19 ENCOUNTER — APPOINTMENT (OUTPATIENT)
Dept: HEMATOLOGY/ONCOLOGY | Facility: HOSPITAL | Age: 86
End: 2021-03-19
Attending: INTERNAL MEDICINE
Payer: MEDICARE

## 2021-05-13 ENCOUNTER — TELEPHONE (OUTPATIENT)
Dept: INTERNAL MEDICINE CLINIC | Facility: CLINIC | Age: 86
End: 2021-05-13

## 2021-05-13 RX ORDER — METOPROLOL TARTRATE 50 MG/1
50 TABLET, FILM COATED ORAL 2 TIMES DAILY
Qty: 180 TABLET | Refills: 1 | Status: SHIPPED | OUTPATIENT
Start: 2021-05-13 | End: 2021-09-08

## 2021-05-13 RX ORDER — METOPROLOL TARTRATE 50 MG/1
50 TABLET, FILM COATED ORAL 2 TIMES DAILY
Qty: 180 TABLET | Refills: 1 | Status: CANCELLED | OUTPATIENT
Start: 2021-05-13

## 2021-05-18 ENCOUNTER — TELEPHONE (OUTPATIENT)
Dept: OBGYN CLINIC | Facility: CLINIC | Age: 86
End: 2021-05-18

## 2021-05-18 NOTE — TELEPHONE ENCOUNTER
Received via fax progress notes dated 5/18/2020 from Dr. Mateo Rosa. To [address] [place] [address] to be reviewed and signed.

## 2021-05-25 NOTE — PROGRESS NOTES
1/10/2019  [facility]  5/30/1930  80year old   female  Pernell Cortez MD    HPI:   Patient presents with:  Consult: Left humerus fracture, Xray done on 12/26/18. Rates pain 4-5/10 at this time. denies any fevers or chills. pt is right handed.       The Hypertension    • Hypothyroidism 1990   • Malignant neoplasm of overlapping sites of right breast in female, estrogen receptor positive ([facility] [address] 3/17/2016   • Mass 2013    excision mass LRF   • MGD (meibomian gland dysfunction) 2011    OU   • Osteopenia deter No          REVIEW OF SYSTEMS:   A 12 point review of systems was performed as documented on the intake form and reviewed by me today with pertinent positives and negatives listed in the HPI. EXAM:   There were no vitals taken for this visit.   The soye Grashey, outlet, and axillary lateral views.     Orders Placed This Encounter      CLOSED RX PROX HUMERUS FRACTURE no

## 2021-06-16 RX ORDER — LOVASTATIN 40 MG/1
TABLET ORAL
Qty: 90 TABLET | Refills: 1 | Status: SHIPPED | OUTPATIENT
Start: 2021-06-16 | End: 2021-12-09

## 2021-09-08 RX ORDER — METOPROLOL TARTRATE 50 MG/1
50 TABLET, FILM COATED ORAL 2 TIMES DAILY
Qty: 180 TABLET | Refills: 1 | Status: SHIPPED | OUTPATIENT
Start: 2021-09-08

## 2021-09-08 NOTE — TELEPHONE ENCOUNTER
Please review. Protocol failed / No protocol. Requested Prescriptions   Pending Prescriptions Disp Refills    metoprolol tartrate 50 MG Oral Tab 180 tablet 1     Sig: Take 1 tablet (50 mg total) by mouth 2 (two) times daily.         Hypertensive Medicat

## 2021-09-08 NOTE — TELEPHONE ENCOUNTER
I have never seen this patient  I did review chart noted that she saw Dr. Alferd Snellen in January  Since he has retired, please ask her to make a follow-up appointment to establish care  Refilled meds in the meantime

## 2021-09-08 NOTE — TELEPHONE ENCOUNTER
•  Metoprolol Tartrate 50 MG Oral Tab, Take 1 tablet (50 mg total) by mouth 2 (two) times daily. , Disp: 180 tablet, Rfl: 1

## 2021-09-22 NOTE — TELEPHONE ENCOUNTER
I Spoke to Patient. She advised Since Dr. Stephanie Ruiz Retired, she found a new Dr with another clinic.  She is no longer coming to [facility]

## 2021-09-23 ENCOUNTER — OFFICE VISIT (OUTPATIENT)
Dept: DERMATOLOGY CLINIC | Facility: CLINIC | Age: 86
End: 2021-09-23
Payer: MEDICARE

## 2021-09-23 DIAGNOSIS — D22.9 MULTIPLE MELANOCYTIC NEVI: ICD-10-CM

## 2021-09-23 DIAGNOSIS — L57.0 ACTINIC KERATOSIS: Primary | ICD-10-CM

## 2021-09-23 DIAGNOSIS — L81.4 SOLAR LENTIGO: ICD-10-CM

## 2021-09-23 DIAGNOSIS — L82.1 SEBORRHEIC KERATOSES: ICD-10-CM

## 2021-09-23 DIAGNOSIS — L57.8 SUN-DAMAGED SKIN: ICD-10-CM

## 2021-09-23 DIAGNOSIS — Z85.828 PERSONAL HISTORY OF SKIN CANCER: ICD-10-CM

## 2021-09-23 PROCEDURE — 17003 DESTRUCT PREMALG LES 2-14: CPT | Performed by: DERMATOLOGY

## 2021-09-23 PROCEDURE — 99213 OFFICE O/P EST LOW 20 MIN: CPT | Performed by: DERMATOLOGY

## 2021-09-23 PROCEDURE — 17000 DESTRUCT PREMALG LESION: CPT | Performed by: DERMATOLOGY

## 2021-09-23 NOTE — PROGRESS NOTES
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 Alta Gregorio - Basal cell carcinoma- nodular type, carcinoma is present at less than 0.5 mm from the deep tissure edge   • Comment: rarely      Drug use: No      Sexual activity: Not on file    Other Topics      Concerns:         Service: Not Asked        Blood Transfusions: Not Asked        Caffeine Concern: Yes          coffee, 1 cup        Occupational Exposure: Not file  Housing Stability:       Unable to Pay for Housing in the Last Year: Not on file      Number of Places Lived in the Last Year: Not on file      Unstable Housing in the Last Year: Not on file  Family History   Problem Relation Age of Onset   • Other ( recurrence–I would recommend monthly self exams and yearly follow-up. Patient make her next appoint with Dr. Arianna Maldonado.   Seborrheic keratoses-diagnosis discussed–reassurance–no treatment  Sun-damaged skin-proper use and application of broad-spectrum sunblock

## 2021-12-05 ENCOUNTER — TELEPHONE (OUTPATIENT)
Dept: HEMATOLOGY/ONCOLOGY | Facility: HOSPITAL | Age: 86
End: 2021-12-05

## 2021-12-05 NOTE — TELEPHONE ENCOUNTER
Reviewed the notes from radiation oncology at [facility] indicating the patient has had a progression of disease and is being evaluated for potential additional treatment. Call to home only for encouragement and spoke with her  while she was sleeping.

## 2021-12-06 ENCOUNTER — TELEPHONE (OUTPATIENT)
Dept: HEMATOLOGY/ONCOLOGY | Facility: HOSPITAL | Age: 86
End: 2021-12-06

## 2021-12-06 NOTE — TELEPHONE ENCOUNTER
Called the patient yesterday only to offer encouragement after seeing the notes from [facility]. She is evidently in a lot of pain. Called her today to return her call.

## 2021-12-06 NOTE — TELEPHONE ENCOUNTER
Patient calling and missed a call from Dr Crispin Fernandez yesterday.     Stating she would like to speak with , and could she please call her back

## 2021-12-07 NOTE — TELEPHONE ENCOUNTER
Current Outpatient Medications:   •  LOVASTATIN 40 MG Oral Tab, TAKE 1 TABLET(40 MG) BY MOUTH EVERY NIGHT, Disp: 90 tablet, Rfl: 1

## 2021-12-09 RX ORDER — LOVASTATIN 40 MG/1
40 TABLET ORAL NIGHTLY
Qty: 90 TABLET | Refills: 1 | Status: SHIPPED | OUTPATIENT
Start: 2021-12-09 | End: 2021-12-13

## 2021-12-09 NOTE — TELEPHONE ENCOUNTER
Refill passed per [place] [facility] [place] protocol. Requested Prescriptions   Pending Prescriptions Disp Refills    Lovastatin 40 MG Oral Tab 90 tablet 1     Sig: Take 1 tablet (40 mg total) by mouth nightly.         Cholesterol Medication Protocol Passed - 12/9

## 2021-12-13 NOTE — TELEPHONE ENCOUNTER
Patient calling again--has new PCP--no longer a patient of [facility] after Dr. Marija Arreguin retired--still sees Dr. Carol Reilly and dermatology    Spoke with Victor Hugo Crane at [facility] and relayed patient's message above--to discontinue all refill requests to [facility]

## 2022-04-15 ENCOUNTER — LAB REQUISITION (OUTPATIENT)
Dept: LAB | Facility: HOSPITAL | Age: 87
End: 2022-04-15
Payer: MEDICARE

## 2022-04-15 PROCEDURE — 88363 XM ARCHIVE TISSUE MOLEC ANAL: CPT | Performed by: OBSTETRICS & GYNECOLOGY

## 2025-06-17 NOTE — TELEPHONE ENCOUNTER
From: Jer Garcia  To: Mike Guadalupe MD  Sent: 4/2/2018 11:55 AM CDT  Subject: Referral Request    Dr Joy Chand, I have not received my referral to [facility] for the special prothesis bras due to my mysctomy to right breast.  Thank you Vianey Finn
Please see patient's message and advise    External DME order pended
Referral to [facility] signed.  Notify patient  Dr HERMAN
Yes - the patient is able to be screened

## (undated) DIAGNOSIS — C56.9 MALIGNANT NEOPLASM OF UNSPECIFIED OVARY (HCC): ICD-10-CM

## (undated) NOTE — LETTER
[address] [place] [address]  Authorization for Invasive Procedures  1.  I hereby authorize Dr. Po Johnson / Hersno Coombs , my physician and whomever may be designated as the doctor's assistant, to perform the following operation and/or procedure: but not all, of the potential risks that can occur: fever and allergic reactions, hemolytic reactions, transmission of disease such as hepatitis, AIDS, cytomegalovirus (CMV), and flluid overload.  In the event that I wish to have autologous transfusions of or desirable in the judgment of my physician.      Signature of Patient:  ________________________________________________ Date: _________Time: _________  Responsible person in case of minor or unconscious: _____________________________Relationship: _______

## (undated) NOTE — ED AVS SNAPSHOT
Hugo Casarez   MRN: [id number]    Department:  [facility] Emergency Department   Date of Visit:  12/26/2018           Disclosure     Insurance plans vary and the physician(s) referred by the ER may not be covered by your plan.  Please contact yo within the next three months to obtain basic health screening including reassessment of your blood pressure.     IF THERE IS ANY CHANGE OR WORSENING OF YOUR CONDITION, CALL YOUR PRIMARY CARE PHYSICIAN AT ONCE OR RETURN IMMEDIATELY TO THE EMERGENCY DEPARTMEN

## (undated) NOTE — ED AVS SNAPSHOT
Josephine Chandler   MRN: [id number]    Department:  [facility] Emergency Department   Date of Visit:  8/8/2018           Disclosure     Insurance plans vary and the physician(s) referred by the ER may not be covered by your plan.  Please contact your within the next three months to obtain basic health screening including reassessment of your blood pressure.     IF THERE IS ANY CHANGE OR WORSENING OF YOUR CONDITION, CALL YOUR PRIMARY CARE PHYSICIAN AT ONCE OR RETURN IMMEDIATELY TO THE EMERGENCY DEPARTMEN

## (undated) NOTE — LETTER
05/23/19        Lydia Soto  [address] [place] [address]      Dear Zoe Sanon records indicate that you have outstanding lab work and or testing that was ordered for you and has not yet been completed:  Orders Placed This Encounter      Jane

## (undated) NOTE — LETTER
October 17, 2017    Makenna Ayoub MD  [address] [place] [address]     Patient: Kobi Adorno   YOB: 1930   Date of Visit: 10/17/2017       Dear Dr. Maldonado Hernandes MD:    Thank you for referring [place] Jose Roberto to me for evaluation. • Heart Disease Brother      CAD - x3 brothers   • Heart Attack Brother      per NG: 3 brothers with MI's   • AAA [OTHER] Brother      AAA   • Diabetes Brother    • Cancer Brother 79     kidney/bladder ca; smoker   • Breast Cancer Daughter 64     DCIS; [facility] Last edited by Deepa Trujillo O.T. on 10/17/2017  3:30 PM. (History)          PHYSICAL EXAM:     Base Eye Exam     Visual Acuity (Snellen - Linear)       Right Left    Dist cc 20/25 -2 20/20    Near cc 20/30 20/20    Correction:  Glasses          Eyad Suspicious, umbilicated lid lesion on the right upper lid nasally discussed with patient. Refer to Dr. Brennen Tabares for evaluation and treatment. No orders of the defined types were placed in this encounter.       Meds This Visit:    No prescriptions

## (undated) NOTE — MR AVS SNAPSHOT
[address] [address]  [phone]               Thank you for choosing us for your health care visit with Cayden Holland MD.  We are glad to serve you and happy to provide you with this s numbers can create reimbursement difficulties for you. To \"[facility]\" for 2 extra bras for prosthetic right breast S/P Right Mastectomy 2016    Functional Status questions complete?:      Assoc Dx:   Malignant neoplasm of overlapping sites of [address] [facility], [phone], [address] [place] [address].  [address] [address], zip code [address] [address]     Phone:  [phone]    - Metoprolol Tartrate 50 MG Tabs            MyChart     Visit MyChart

## (undated) NOTE — LETTER
Patient Name: Flex Trejo  : 1930  MRN: [id number]  Patient Address: [address] [place] [id number]      Coronavirus Disease 2019 (COVID-19)     [facility] is committed to the safety and well-being of our patients, members, carefully. If your symptoms get worse, call your healthcare provider immediately. 3. Get rest and stay hydrated.    4. If you have a medical appointment, call the healthcare provider ahead of time and tell them that you have or may have COVID-19.  5. For m of fever-reducing medications; and  · Improvement in respiratory symptoms (e.g., cough, shortness of breath); and  · At least 10 days have passed since symptoms first appeared OR if asymptomatic patient or date of symptom onset is unclear then use 10 days donors must:    · Have had a confirmed diagnosis of COVID-19  · Be symptom-free for at least 14 days*    *Some people will be required to have a repeat COVID-19 test in order to be eligible to donate.  If you’re instructed by Stu that a repeat test is r